# Patient Record
Sex: FEMALE | Race: BLACK OR AFRICAN AMERICAN | NOT HISPANIC OR LATINO | Employment: OTHER | URBAN - METROPOLITAN AREA
[De-identification: names, ages, dates, MRNs, and addresses within clinical notes are randomized per-mention and may not be internally consistent; named-entity substitution may affect disease eponyms.]

---

## 2017-02-28 ENCOUNTER — GENERIC CONVERSION - ENCOUNTER (OUTPATIENT)
Dept: OTHER | Facility: OTHER | Age: 72
End: 2017-02-28

## 2017-06-19 ENCOUNTER — GENERIC CONVERSION - ENCOUNTER (OUTPATIENT)
Dept: OTHER | Facility: OTHER | Age: 72
End: 2017-06-19

## 2017-06-30 ENCOUNTER — GENERIC CONVERSION - ENCOUNTER (OUTPATIENT)
Dept: OTHER | Facility: OTHER | Age: 72
End: 2017-06-30

## 2018-01-10 ENCOUNTER — ALLSCRIPTS OFFICE VISIT (OUTPATIENT)
Dept: OTHER | Facility: OTHER | Age: 73
End: 2018-01-10

## 2018-01-10 DIAGNOSIS — E55.9 VITAMIN D DEFICIENCY: ICD-10-CM

## 2018-01-10 DIAGNOSIS — E78.5 HYPERLIPIDEMIA: ICD-10-CM

## 2018-01-16 ENCOUNTER — GENERIC CONVERSION - ENCOUNTER (OUTPATIENT)
Dept: OTHER | Facility: OTHER | Age: 73
End: 2018-01-16

## 2018-01-16 LAB — SPECIMEN STATUS REPORT (HISTORICAL): NORMAL

## 2018-01-22 VITALS
OXYGEN SATURATION: 95 % | RESPIRATION RATE: 16 BRPM | BODY MASS INDEX: 35.09 KG/M2 | HEART RATE: 60 BPM | DIASTOLIC BLOOD PRESSURE: 80 MMHG | WEIGHT: 198.03 LBS | HEIGHT: 63 IN | SYSTOLIC BLOOD PRESSURE: 138 MMHG

## 2018-02-26 NOTE — PROGRESS NOTES
Assessment    1  Encounter for preventive health examination (V70 0) (Z00 00)   2  Screening for genitourinary condition (V81 6) (Z13 89)   3  Vitamin D deficiency (268 9) (E55 9)   4  Non smoker / tobacco user (V49 89) (Z78 9)   5  Upper back pain on right side (724 5) (M54 9)   6  Osteoarthritis of right knee (715 96) (M17 11)    Plan  Dyslipidemia, Vitamin D deficiency    · (1) VITAMIN D 25-HYDROXY; Status:Active; Requested [id number];    · (Q) LIPID PANEL WITH REFLEX TO DIRECT LDL; Status:Active; Requested  HHW:[id number]; Health Maintenance    · Stop: Influenza  Screening for genitourinary condition    · *VB - Urinary Incontinence Screen (Dx Z13 89 Screen for UI); Status:Complete -  Retrospective By Protocol Authorization;   Done: [id number] 03:52PM  Upper back pain on right side    · TiZANidine HCl - 4 MG Oral Tablet; TAKE 1 TABLET 3 TIMES DAILY AS NEEDED    Discussion/Summary    Consider ortho eval/ injections to the knees  Impression: Initial Annual Wellness Visit, with preventive exam as well as age and risk appropriate counseling completed  Cardiovascular screening and counseling: due for a lipid panel and Dx - V81 2 Screen for CV Disorder  Diabetes screening and counseling: will obtain labs from [facility]  Colorectal cancer screening and counseling: the risks and benefits of screening were discussed  Breast cancer screening and counseling: screening is current and Mammo in March  Cervical cancer screening and counseling: screening is current and She will be seeing gyn soon  Osteoporosis screening and counseling: the risks and benefits of screening were discussed and Will obtain labs  Abdominal aortic aneurysm screening and counseling: screening not indicated  Glaucoma screening and counseling: the risks and benefits of screening were discussed  HIV screening and counseling: screening not indicated   Immunizations: the risks and benefits of influenza vaccination were discussed with the patient, the patient declines the influenza vaccination, the risks and benefits of pneumococcal vaccination were discussed with the patient, Due for Prevnar, would like to defer, the risks and benefits of the Zostavax vaccine were discussed with the patient and get ok from oncologist    Advance Directive Planning: not complete  Patient Discussion: plan discussed with the patient, follow-up visit needed in one year  Possible side effects of new medications were reviewed with the patient/guardian today  The treatment plan was reviewed with the patient/guardian  The patient/guardian understands and agrees with the treatment plan      History of Present Illness  Welcome to Medicare and Wellness Visits: The patient is being seen for the subsequent annual wellness visit  Medicare Screening and Risk Factors   Hospitalizations: no previous hospitalizations  Once per lifetime medicare screening tests: ECG has not been done  Medicare Screening Tests Risk Questions   Abdominal aortic aneurysm risk assessment: none indicated  Osteoporosis risk assessment: female gender and over 48years of age  HIV risk assessment: none indicated  Drug and Alcohol Use: The patient has never smoked cigarettes  The patient reports never drinking alcohol  She has never used illicit drugs  Diet and Physical Activity: Current diet includes well balanced meals  She exercises daily  Exercise: walking 8 hours per week  Mood Disorder and Cognitive Impairment Screening: PHQ-9 Depression Scale   Over the past 2 weeks, how often have you been bothered by the following problems? 1 ) Little interest or pleasure in doing things? Not at all    2 ) Feeling down, depressed or hopeless? Several days  3 ) Trouble falling asleep or sleeping too much? Nearly every day  4 ) Feeling tired or having little energy? Several days  5 ) Poor appetite or overeating?  Not at all    6 ) Feeling bad about yourself, or that you are a failure, or have let yourself or your family down? Not at all    7 ) Trouble concentrating on things, such as reading a newspaper or watching television? Not at all    8 ) Moving or speaking so slowly that other people could have noticed, or the opposite, moving or speaking faster than usual? Not at all  How difficult have these problems made it for you to do your work, take care of things at home, or get along with people? Not at all  Depression screening  negative for symptoms  She denies feeling down, depressed, or hopeless over the past two weeks  She denies feeling little interest or pleasure in doing things over the past two weeks  Cognitive impairment screening: denies difficulty learning/retaining new information, denies difficulty handling complex tasks, denies difficulty with reasoning, denies difficulty with language and denies difficulty with behavior  Functional Ability/Level of Safety: Hearing is normal bilaterally  She denies hearing difficulties  She does not use a hearing aid  The patient is currently able to do activities of daily living without limitations, able to do instrumental activities of daily living without limitations, able to participate in social activities without limitations and able to drive without limitations  Activities of daily living details: does not need help using the phone, no transportation help needed, does not need help shopping, no meal preparation help needed, does not need help doing housework, does not need help doing laundry, does not need help managing medications and does not need help managing money  Fall risk factors: The patient fell 0 times in the past 12 months  Injury History: no polypharmacy, no alcohol use, no mobility impairment, no antidepressant use, no deconditioning, no postural hypotension, no sedative use, no visual impairment, no urinary incontinence, no antihypertensive use, no cognitive impairment, up and go test was normal and no previous fall     Home safety risk factors:  no unfamiliar surroundings, no loose rugs, no poor household lighting, no uneven floors, no household clutter, grab bars in the bathroom and handrails on the stairs  Advance Directives: Advance directives: Currently working on it, but no living will, no durable power of  for health care directives and no advance directives  Co-Managers and Medical Equipment/Suppliers: See Patient Care Team   Preventive Quality Program 65 and Older: The patient currently has no urinary incontinence symptoms  Patient Care Team    Care Team Member Role Specialty Office Number   Carlee Meade DO  Hematology Oncology [phone]   [address] [place] [address]  Surgical Oncology [phone] [address] [facility]  Orthopedic Surgery [phone]     Review of Systems    Constitutional: no fever and no chills  Cardiovascular: no chest pain and no palpitations  Respiratory: no cough  Gastrointestinal: hemorrhoids, uses OTC treatment, but no abdominal pain and no constipation  Genitourinary: no dysuria  Musculoskeletal: pain in the neck on the right upper back, lower back, knees; asking for muscle relaxant, no releif from Aleve  Psychiatric: no anxiety and no depression  Active Problems    1  Anxiety (300 00) (F41 9)   2  Benign colon polyp (211 3) (K63 5)   3  Bulging lumbar disc (722 10) (M51 26)   4  Dyslipidemia (272 4) (E78 5)   5  Flu vaccine need (V04 81) (Z23)   6  Hemorrhoids (455 6) (K64 9)   7  Insomnia (780 52) (G47 00)   8  Osteoarthritis of right knee (715 96) (M17 11)   9  Spider veins (448 1) (I78 1)   10  Uterine cancer (179) (C55)   11   Venous insufficiency (459 81) (I87 2)    Past Medical History    · History of Acute bronchitis (466 0) (J20 9)   · History of Backache (724 5) (M54 9)   · History of Breast Cancer (V10 3)   · History of Cough (786 2) (R05)   · History of headache (V13 89) (X69 068)   · History of Left knee pain (719 46) (M25 562)   · History of Preop examination (V72 84) (Z01 818)   · History of Visit for pre-operative examination (V72 84) (S11 657)    The active problems and past medical history were reviewed and updated today  Surgical History    · History of Breast Surgery Mastectomy   · History of Breast Surgery Reconstruction   · History of Breast Surgery Reduction Procedure   · History of Knee Replacement   · History of Total Abdominal Hysterectomy With Removal Of Ovary(S)   · History of Total Knee Arthroplasty    The surgical history was reviewed and updated today  Family History  Father    · Family history of Malignant Pancreatic Neoplasm    The family history was reviewed and updated today  Social History    · Denied: Alcohol Use (History)   · Exercising Regularly   · Non smoker / tobacco user (V49 89) (Z78 9)  The social history was reviewed and updated today  Current Meds   1  Caltrate 600+D 600-400 MG-UNIT TABS; TAKE 1 TABLET 3 times daily; Therapy: (Recorded:[id number]) to Recorded    The medication list was reviewed and updated today  Allergies    1  Penicillin V Potassium SOLR   2  Percocet TABS    Immunizations   1    Influenza  01-Nov-2014  (69y)    PPSV  01-Nov-2014  (69y)     Vitals  Signs    Heart Rate: 60  Respiration: 16  Systolic: 380  Diastolic: 80  Height: 5 ft 2 5 in  Weight: 198 lb 0 4 oz  BMI Calculated: 35 64  BSA Calculated: 1 91  O2 Saturation: 95    Physical Exam    Constitutional   General appearance: No acute distress, well appearing and well nourished  obese  Head and Face   Head and face: Normal     Eyes   Conjunctiva and lids: No swelling, erythema or discharge  Ears, Nose, Mouth, and Throat   Otoscopic examination: Tympanic membranes translucent with normal light reflex  Canals patent without erythema  Oropharynx: Normal with no erythema, edema, exudate or lesions  Neck   Neck: Supple, symmetric, trachea midline, no masses  Thyroid: Normal, no thyromegaly      Pulmonary   Respiratory effort: No increased work of breathing or signs of respiratory distress  Auscultation of lungs: Clear to auscultation  Cardiovascular   Auscultation of heart: Normal rate and rhythm, normal S1 and S2, no murmurs  Examination of extremities for edema and/or varicosities: Normal     Abdomen   Abdomen: Non-tender, no masses  Lymphatic   Palpation of lymph nodes in neck: No lymphadenopathy      Musculoskeletal   Gait and station: Normal     Psychiatric   Orientation to person, place, and time: Normal     Mood and affect: Normal        Results/Data  *VB - Urinary Incontinence Screen (Dx Z13 89 Screen for UI) [id number] 03:52PM Conner Myers     Test Name Result Flag Reference   Urinary Incontinence Assessment [id number]         Signatures   Electronically signed by : BARRY Vasquez ; Jefry 10 2018 10:52PM EST                       (Author)

## 2018-08-07 DIAGNOSIS — M62.830 BACK SPASM: Primary | ICD-10-CM

## 2018-08-07 RX ORDER — CYCLOBENZAPRINE HCL 5 MG
5 TABLET ORAL
Qty: 10 TABLET | Refills: 0 | Status: SHIPPED | OUTPATIENT
Start: 2018-08-07 | End: 2018-12-28 | Stop reason: HOSPADM

## 2018-08-07 NOTE — TELEPHONE ENCOUNTER
Pls call her to schedule a f/u if the back pain does not improve  Also, she is due for a routine f/u

## 2018-08-08 NOTE — TELEPHONE ENCOUNTER
Patient called back she said she is taking the muscle relaxant and ibuprofen, she said she is going to see if it helps this week  But if she does not improve she will call next week to set up a follow up appointment

## 2018-12-27 ENCOUNTER — OFFICE VISIT (OUTPATIENT)
Dept: INTERNAL MEDICINE CLINIC | Facility: CLINIC | Age: 73
End: 2018-12-27
Payer: MEDICARE

## 2018-12-27 VITALS
DIASTOLIC BLOOD PRESSURE: 74 MMHG | BODY MASS INDEX: 34.76 KG/M2 | HEIGHT: 63 IN | WEIGHT: 196.2 LBS | SYSTOLIC BLOOD PRESSURE: 124 MMHG

## 2018-12-27 DIAGNOSIS — C50.912 BREAST CANCER, STAGE 1, LEFT (HCC): ICD-10-CM

## 2018-12-27 DIAGNOSIS — C55 MALIGNANT NEOPLASM OF UTERUS, UNSPECIFIED SITE (HCC): ICD-10-CM

## 2018-12-27 DIAGNOSIS — R93.2 ABNORMAL CT OF LIVER: ICD-10-CM

## 2018-12-27 DIAGNOSIS — G89.29 CHRONIC BILATERAL LOW BACK PAIN WITHOUT SCIATICA: Primary | ICD-10-CM

## 2018-12-27 DIAGNOSIS — M54.50 CHRONIC BILATERAL LOW BACK PAIN WITHOUT SCIATICA: Primary | ICD-10-CM

## 2018-12-27 DIAGNOSIS — Z85.3 HISTORY OF BREAST CANCER: ICD-10-CM

## 2018-12-27 PROCEDURE — 1036F TOBACCO NON-USER: CPT | Performed by: INTERNAL MEDICINE

## 2018-12-27 PROCEDURE — 1160F RVW MEDS BY RX/DR IN RCRD: CPT | Performed by: INTERNAL MEDICINE

## 2018-12-27 PROCEDURE — 99214 OFFICE O/P EST MOD 30 MIN: CPT | Performed by: INTERNAL MEDICINE

## 2018-12-27 RX ORDER — PREDNISONE 50 MG/1
TABLET ORAL
Refills: 0 | COMMUNITY
Start: 2018-12-22 | End: 2019-02-08 | Stop reason: ALTCHOICE

## 2018-12-27 RX ORDER — GABAPENTIN 100 MG/1
CAPSULE ORAL
Qty: 90 CAPSULE | Refills: 2 | Status: SHIPPED | OUTPATIENT
Start: 2018-12-27 | End: 2019-02-08 | Stop reason: HOSPADM

## 2018-12-27 NOTE — PROGRESS NOTES
Assessment/Plan:  I suspect that the pain in the LE is related to lumbar pathology  Start gabapentin  Schedule MRI and establish with pain mgt  Incidental liver lesion on CT-obtain MRI       Problem List Items Addressed This Visit        Genitourinary    Uterine cancer ([facility] [address] )       Other    Breast cancer, stage 1, left ([facility] [address] )    History of breast cancer      Other Visit Diagnoses     Chronic bilateral low back pain without sciatica    -  Primary    Relevant Medications    gabapentin (NEURONTIN) 100 mg capsule    Other Relevant Orders    Ambulatory referral to Pain Management    MRI lumbar spine w wo contrast    Abnormal CT of liver        Relevant Orders    MRI abdomen w wo contrast            Subjective:      Patient ID: Anu Butterfield is a 75 y o  female  HPI  Chronic burning, numbness, cold feeling  In both legs, below the knees, not the feet  She has had both knees replaced without improvement of the pain  + Chronic low back pain but does not radiate  She went to urgent care on 12/22 then was sent to the ER when her DDimer was elevated  Lumbar spine XR  Degenerative changes  No DVT in LE dopplers; No PE on CTA of the chest but +nonspecific right lung nodular opacities, liver lesion to be further evaluated with an MRI and cholelithiasis  Worried abut findings because she has had right and left breast cancer and endometrial cancer    The following portions of the patient's history were reviewed and updated as appropriate: allergies, current medications, past family history, past medical history, past social history and problem list     Review of Systems   Constitutional: Negative for fatigue, fever and unexpected weight change  HENT: Negative for ear pain, hearing loss, sinus pain, sinus pressure and sore throat  Respiratory: Negative for cough, shortness of breath and wheezing  Cardiovascular: Negative for chest pain, palpitations and leg swelling     Gastrointestinal: Negative for abdominal pain, constipation, diarrhea, nausea and vomiting  Musculoskeletal: Positive for back pain  Negative for arthralgias and myalgias  Neurological: Positive for numbness  Negative for dizziness and headaches  Objective:      /74   Ht 5' 3" (1 6 m)   Wt 89 kg (196 lb 3 2 oz)   BMI 34 76 kg/m²          Physical Exam   Constitutional: She is oriented to person, place, and time  She appears well-developed and well-nourished  HENT:   Head: Normocephalic and atraumatic  Eyes: Conjunctivae are normal    Neck: Neck supple  Cardiovascular: Normal rate, regular rhythm and normal heart sounds  No murmur heard  Pulmonary/Chest: Effort normal and breath sounds normal  No respiratory distress  She has no wheezes  She has no rales  Abdominal: Soft  Bowel sounds are normal  She exhibits no distension and no mass  There is no tenderness  There is no rebound and no guarding  Musculoskeletal: Normal range of motion  She exhibits no edema  No tenderness in the lumbar spine  -SLR   Neurological: She is alert and oriented to person, place, and time  Skin: Skin is warm and dry  Psychiatric: She has a normal mood and affect   Her behavior is normal  Judgment and thought content normal

## 2018-12-28 PROBLEM — C50.912 BREAST CANCER, STAGE 1, LEFT (HCC): Status: ACTIVE | Noted: 2017-02-27

## 2018-12-28 PROBLEM — Z85.3 HISTORY OF BREAST CANCER: Status: ACTIVE | Noted: 2018-06-20

## 2019-01-14 ENCOUNTER — TELEPHONE (OUTPATIENT)
Dept: INTERNAL MEDICINE CLINIC | Facility: CLINIC | Age: 74
End: 2019-01-14

## 2019-01-14 DIAGNOSIS — F40.240 CLAUSTROPHOBIA: Primary | ICD-10-CM

## 2019-01-14 RX ORDER — ALPRAZOLAM 0.25 MG/1
TABLET ORAL
Qty: 3 TABLET | Refills: 0 | Status: SHIPPED | OUTPATIENT
Start: 2019-01-14 | End: 2019-02-08 | Stop reason: SDUPTHER

## 2019-01-14 NOTE — TELEPHONE ENCOUNTER
Patient is scheduled for an MRI tomorrow and on Thursday  Patient said she is claustrophobic and is asking if you can give her a sedative to help relax her      Please advise

## 2019-01-15 ENCOUNTER — HOSPITAL ENCOUNTER (OUTPATIENT)
Dept: MRI IMAGING | Facility: HOSPITAL | Age: 74
Discharge: HOME | End: 2019-01-15
Payer: COMMERCIAL

## 2019-01-15 DIAGNOSIS — M54.50 CHRONIC BILATERAL LOW BACK PAIN WITHOUT SCIATICA: ICD-10-CM

## 2019-01-15 DIAGNOSIS — G89.29 CHRONIC BILATERAL LOW BACK PAIN WITHOUT SCIATICA: ICD-10-CM

## 2019-01-15 PROCEDURE — A9585 GADOBUTROL INJECTION: HCPCS | Performed by: INTERNAL MEDICINE

## 2019-01-15 PROCEDURE — 72158 MRI LUMBAR SPINE W/O & W/DYE: CPT

## 2019-01-15 RX ADMIN — GADOBUTROL 8 ML: 604.72 INJECTION INTRAVENOUS at 07:19

## 2019-01-17 ENCOUNTER — HOSPITAL ENCOUNTER (OUTPATIENT)
Dept: MRI IMAGING | Facility: HOSPITAL | Age: 74
Discharge: HOME | End: 2019-01-17
Payer: COMMERCIAL

## 2019-01-17 DIAGNOSIS — R93.2 ABNORMAL CT OF LIVER: ICD-10-CM

## 2019-01-17 DIAGNOSIS — K80.20 CALCULUS OF GALLBLADDER WITHOUT CHOLECYSTITIS WITHOUT OBSTRUCTION: Primary | ICD-10-CM

## 2019-01-17 PROCEDURE — A9585 GADOBUTROL INJECTION: HCPCS | Performed by: INTERNAL MEDICINE

## 2019-01-17 PROCEDURE — 74183 MRI ABD W/O CNTR FLWD CNTR: CPT

## 2019-01-17 RX ADMIN — GADOBUTROL 9 ML: 604.72 INJECTION INTRAVENOUS at 08:55

## 2019-01-28 ENCOUNTER — TRANSCRIBE ORDERS (OUTPATIENT)
Dept: ADMINISTRATIVE | Facility: HOSPITAL | Age: 74
End: 2019-01-28

## 2019-01-28 DIAGNOSIS — K80.18 CALCULUS OF GALLBLADDER WITH OTHER CHOLECYSTITIS WITHOUT OBSTRUCTION: Primary | ICD-10-CM

## 2019-01-31 ENCOUNTER — OFFICE VISIT (OUTPATIENT)
Dept: PAIN MEDICINE | Facility: MEDICAL CENTER | Age: 74
End: 2019-01-31
Payer: COMMERCIAL

## 2019-01-31 VITALS
RESPIRATION RATE: 14 BRPM | HEIGHT: 63 IN | HEART RATE: 71 BPM | SYSTOLIC BLOOD PRESSURE: 128 MMHG | BODY MASS INDEX: 34.45 KG/M2 | WEIGHT: 194.4 LBS | DIASTOLIC BLOOD PRESSURE: 83 MMHG

## 2019-01-31 DIAGNOSIS — M54.50 CHRONIC BILATERAL LOW BACK PAIN WITHOUT SCIATICA: ICD-10-CM

## 2019-01-31 DIAGNOSIS — G89.29 CHRONIC BILATERAL LOW BACK PAIN WITHOUT SCIATICA: ICD-10-CM

## 2019-01-31 DIAGNOSIS — M48.061 SPINAL STENOSIS OF LUMBAR REGION WITHOUT NEUROGENIC CLAUDICATION: ICD-10-CM

## 2019-01-31 DIAGNOSIS — M47.816 LUMBAR SPONDYLOSIS: Primary | ICD-10-CM

## 2019-01-31 PROCEDURE — 99204 OFFICE O/P NEW MOD 45 MIN: CPT | Performed by: PHYSICAL MEDICINE & REHABILITATION

## 2019-01-31 RX ORDER — KETOROLAC TROMETHAMINE 10 MG/1
10 TABLET, FILM COATED ORAL EVERY 6 HOURS PRN
COMMUNITY
End: 2019-02-08 | Stop reason: ALTCHOICE

## 2019-01-31 NOTE — LETTER
January 31, 2019     Ping Saravia MD  [address] [address]  [address] [place] [address]    Patient: Micaela Stark   YOB: 1945   Date of Visit: 1/31/2019       Dear Dr Vicky Cesar: Thank you for referring Micaela Stark to me for evaluation  Below are my notes for this consultation  If you have questions, please do not hesitate to call me  I look forward to following your patient along with you  Sincerely,        Mendoza Chamberlain DO        CC: No Recipients  Mendoza Chamberlain DO  1/31/2019 10:10 AM  Sign at close encounter  Assessment:  1  Lumbar spondylosis    2  Chronic bilateral low back pain without sciatica    3  Spinal stenosis of lumbar region without neurogenic claudication        Plan:  1  We will schedule the patient for bilateral L3-5 medial branch nerve blocks with intention of moving forward towards radiofrequency ablation if there is an appropriate diagnostic response  The initial blocks will be performed with 2% lidocaine and if an appropriate response is obtained upon review of the patient's pain diary, a confirmatory block will be scheduled with 0 5% bupivacaine  In the office today, we reviewed the nature of facet joint pathology in depth using a spine model  We discussed the approach we would use for the injections and provided literature for home review  The patient understands the risks associated with the procedure including bleeding, infection, tissue injury, and allergic reaction and provided verbal informed consent in the office today  2  The patient does have some rather significant spinal stenosis  On exam she does not demonstrate any significant signs or symptomatology consistent with neurogenic claudication  3  She is complaining of bilateral knee pain and I have asked her to follow back up with her orthopedic surgeon for further evaluation    If this continues to be problematic and there are no treatments offered from her orthopedist would consider genicular nerve blocks  My impressions and treatment recommendations were discussed in detail with the patient who verbalized understanding and had no further questions  Discharge instructions were provided  I personally saw and examined the patient and I agree with the above discussed plan of care  Orders Placed This Encounter   Procedures    FL spine and pain procedure     Standing Status:   Future     Standing Expiration Date:   1/31/2020     Order Specific Question:   Reason for Exam:     Answer:   (B) L3-5 MBB#1     Order Specific Question:   Anticoagulant hold needed? Answer:   no     New Medications Ordered This Visit   Medications    iohexol (OMNIPAQUE) 300 mg/mL     Sig: Infuse 100 mL into a venous catheter once in imaging for contrast    ketorolac (TORADOL) 10 mg tablet     Sig: Take 10 mg by mouth every 6 (six) hours as needed for moderate pain       History of Present Illness: Wayne Esparza is a 75 y o  female sent for consultation regarding chronic axial lower back pain as well as bilateral knee pain  The patient describes continuous ongoing pain for the past 8 years without any inciting event or trauma and describes this as severe pain rated as a 9/10 which is constant without any typical pattern  She characterizes the pain as cramping, numbness, sharp, dull, aching, pressure-like, throbbing  She localizes most the pains are her axial lumbar spine without radiation  She also describes bilateral knee pain with some referral into the shins  She denies any radiating pain from the back into the feet  Aggravating factors include lying down, standing, bending, sitting, walking  She has had bilateral total knee arthroplasties  She describes no relief from this    She also has not experience any relief with physical therapy, exercise, heat or ice application, chiropractic manipulation or osteopathic manipulation care    Currently using cyclobenzaprine, Prednisone, and gabapentin without relief of symptoms  I have personally reviewed and/or updated the patient's past medical history, past surgical history, family history, social history, current medications, allergies, and vital signs today  Review of Systems:    Review of Systems   Cardiovascular: Positive for leg swelling  Musculoskeletal: Positive for back pain (and lower part of both legs), gait problem and joint swelling  Neurological: Positive for dizziness and weakness  Patient Active Problem List   Diagnosis    Breast cancer[facility] stage 1, left ([facility] )    Uterine cancer ([facility] )    History of breast cancer       Past Medical History:   Diagnosis Date    Breast cancer ([facility] )     B/L       Past Surgical History:   Procedure Laterality Date    BREAST LUMPECTOMY Right     BREAST RECONSTRUCTION Right 2010    JOINT REPLACEMENT Bilateral     MASTECTOMY Right     ORTHOPEDIC SURGERY Bilateral     knee arthoscopy    RADICAL ABDOMINAL HYSTERECTOMY         Family History   Problem Relation Age of Onset   Devonte Gates Stroke Mother     Hypertension Mother     Cancer Father        Social History     Occupational History    retired      Social History Main Topics    Smoking status: Never Smoker    Smokeless tobacco: Never Used    Alcohol use No    Drug use: No    Sexual activity: No       Current Outpatient Prescriptions on File Prior to Visit   Medication Sig    ALPRAZolam (XANAX) 0 25 mg tablet 1-2 tabs PO 30mins prior to the MRI    Calcium Carbonate-Vitamin D (CALTRATE 600+D) 600-400 MG-UNIT per tablet Take 1 tablet by mouth 3 (three) times a day    gabapentin (NEURONTIN) 100 mg capsule 1 cap PO daily for 3 days and increase to BID then to TID if tolerated    predniSONE 50 mg tablet TAKE 1 TABLET BY MOUTH DAILY FOR 6 DAYS     No current facility-administered medications on file prior to visit          Allergies   Allergen Reactions    Other Other (See Comments)     rash    Oxycodone-Acetaminophen GI Intolerance     Other reaction(s): GI  issues    Penicillin V Rash     Reaction Date: [id number];        Physical Exam:    /83   Pulse 71   Resp 14   Ht 5' 3" (1 6 m)   Wt 88 2 kg (194 lb 6 4 oz)   BMI 34 44 kg/m²      LUMBAR  General: Well-developed, well-nourished individual in no acute distress  Mental: Appropriate mood and affect  Grossly oriented with coherent speech and thought processing   Neuro:  Cranial nerves: Cranial nerve function is grossly intact bilaterally   Strength: Bilateral lower extremity strength is normal and symmetric   No atrophy or tone abnormalities noted   Reflexes: Bilateral lower extremity muscle stretch reflexes are absent at the knees and ankles    No ankle clonus is noted   Sensation: No loss of sensation is noted   SLR/Foraminal Compression Maneuvers: Straight leg raising in the sitting position is  positive for radicular pain   Gait:  Gait/gross motor: Gait is normal  Station is normal  Toe walking, heel walking  are normal     Musculoskeletal:  Palpation: Inspection and palpation of the spine and extremities are unremarkable except for tenderness to palpation over the lumbar facet joints  Spine: Normal pain-free range of motion except for lumbar extension which is significantly limited in reproduces her axial mechanical low back pain  No gross axial skeletal deformities   Skin: Skin inspection grossly negative for erythema, breakdown, or concerning lesions in affected area   Lymph: No lymphadenopathy is appreciated in the involved extremity   Vessels: No lower extremity edema   Lungs: Breathing is comfortable and regular  No dyspnea noted during examination   Eyes: Visual field grossly intact to confrontation  No redness appreciated  ENT: No craniofacial deformities or asymmetry  No neck masses appreciated           ImagingReason For Exam     chronic low back pain with neuropathy in both LE   Dx: Chronic bilateral low back pain without sciatica [M54 5, G20 29 (ICD-10-CM)]   Study Result     MRI LUMBAR SPINE WITH AND WITHOUT CONTRAST     INDICATION: M54 5: Low back pain  G89 29: Other chronic pain  Chronic bilateral low back pain, neuropathy without sciatica     COMPARISON:  None      TECHNIQUE:  Sagittal T1, sagittal T2, sagittal inversion recovery, axial T1 and axial T2, coronal T2  Sagittal and axial T1 postcontrast      IV Contrast:  8 mL of gadobutrol injection (MULTI-DOSE)      IMAGE QUALITY:  Diagnostic     FINDINGS:     VERTEBRAL BODIES:  Degenerative grade 1 L4-L5 anterolisthesis, there is also minor anterolisthesis of L4 referable to the slightly retrolisthesed L3 vertebral body  Minor leftward translation of L3  Normal marrow signal is identified within the   visualized bony structures  No discrete marrow lesion      SACRUM:  Normal signal within the sacrum  No evidence of insufficiency or stress fracture      DISTAL CORD AND CONUS:  Normal size and signal within the distal cord and conus        PARASPINAL SOFT TISSUES:  Paraspinal soft tissues are unremarkable      LOWER THORACIC DISC SPACES:  Normal disc height and signal   No disc herniation, canal stenosis or foraminal narrowing      LUMBAR DISC SPACES:     L1-L2:  Small marginal osteophytes, minor bulge      L2-L3:  Slight reduction disc height, minor circumferential bulge with moderate facet arthrosis      L3-L4:  Moderate facet arthrosis, relative retrolisthesis of L3 on L4  Minor narrowing of both lateral recesses      L4-L5:  Degenerative grade 1 anterolisthesis  Disc extends asymmetrically to the left, into the foramen, no evidence for L4 root compromise  Redundancy of the ligaments and circumferential bulge result in the moderate right and moderately severe left   lateral recess stenosis, correlate for left L5 radiculitis        L5-S1:  Moderate bilateral facet arthrosis, minor bulge      POSTCONTRAST IMAGING:  No abnormal enhancement      IMPRESSION:     Degenerative grade 1 anterolisthesis L4 on L5 with potentially significant left lateral recess stenosis    Correlate for left L5 radiculitis         Workstation performed: [id number]

## 2019-01-31 NOTE — PROGRESS NOTES
Assessment:  1  Lumbar spondylosis    2  Chronic bilateral low back pain without sciatica    3  Spinal stenosis of lumbar region without neurogenic claudication        Plan:  1  We will schedule the patient for bilateral L3-5 medial branch nerve blocks with intention of moving forward towards radiofrequency ablation if there is an appropriate diagnostic response  The initial blocks will be performed with 2% lidocaine and if an appropriate response is obtained upon review of the patient's pain diary, a confirmatory block will be scheduled with 0 5% bupivacaine  In the office today, we reviewed the nature of facet joint pathology in depth using a spine model  We discussed the approach we would use for the injections and provided literature for home review  The patient understands the risks associated with the procedure including bleeding, infection, tissue injury, and allergic reaction and provided verbal informed consent in the office today  2  The patient does have some rather significant spinal stenosis  On exam she does not demonstrate any significant signs or symptomatology consistent with neurogenic claudication  3  She is complaining of bilateral knee pain and I have asked her to follow back up with her orthopedic surgeon for further evaluation  If this continues to be problematic and there are no treatments offered from her orthopedist would consider genicular nerve blocks  My impressions and treatment recommendations were discussed in detail with the patient who verbalized understanding and had no further questions  Discharge instructions were provided  I personally saw and examined the patient and I agree with the above discussed plan of care      Orders Placed This Encounter   Procedures    FL spine and pain procedure     Standing Status:   Future     Standing Expiration Date:   1/31/2020     Order Specific Question:   Reason for Exam:     Answer:   (B) L3-5 MBB#1     Order Specific Question:   Anticoagulant hold needed? Answer:   no     New Medications Ordered This Visit   Medications    iohexol (OMNIPAQUE) 300 mg/mL     Sig: Infuse 100 mL into a venous catheter once in imaging for contrast    ketorolac (TORADOL) 10 mg tablet     Sig: Take 10 mg by mouth every 6 (six) hours as needed for moderate pain       History of Present Illness: Mary Salazar is a 75 y o  female sent for consultation regarding chronic axial lower back pain as well as bilateral knee pain  The patient describes continuous ongoing pain for the past 8 years without any inciting event or trauma and describes this as severe pain rated as a 9/10 which is constant without any typical pattern  She characterizes the pain as cramping, numbness, sharp, dull, aching, pressure-like, throbbing  She localizes most the pains are her axial lumbar spine without radiation  She also describes bilateral knee pain with some referral into the shins  She denies any radiating pain from the back into the feet  Aggravating factors include lying down, standing, bending, sitting, walking  She has had bilateral total knee arthroplasties  She describes no relief from this  She also has not experience any relief with physical therapy, exercise, heat or ice application, chiropractic manipulation or osteopathic manipulation care    Currently using cyclobenzaprine, Prednisone, and gabapentin without relief of symptoms  I have personally reviewed and/or updated the patient's past medical history, past surgical history, family history, social history, current medications, allergies, and vital signs today  Review of Systems:    Review of Systems   Cardiovascular: Positive for leg swelling  Musculoskeletal: Positive for back pain (and lower part of both legs), gait problem and joint swelling  Neurological: Positive for dizziness and weakness         Patient Active Problem List   Diagnosis    [facility] left [facility]    [facility]terine cancer [facility]    History of breast cancer       Past Medical History:   Diagnosis Date    Breast cancer [facility] )     B/L       Past Surgical History:   Procedure Laterality Date    BREAST LUMPECTOMY Right     BREAST RECONSTRUCTION Right 2010    JOINT REPLACEMENT Bilateral     MASTECTOMY Right     ORTHOPEDIC SURGERY Bilateral     knee arthoscopy    RADICAL ABDOMINAL HYSTERECTOMY         Family History   Problem Relation Age of Onset   Amalia Blue Stroke Mother     Hypertension Mother     Cancer Father        Social History     Occupational History    retired      Social History Main Topics    Smoking status: Never Smoker    Smokeless tobacco: Never Used    Alcohol use No    Drug use: No    Sexual activity: No       Current Outpatient Prescriptions on File Prior to Visit   Medication Sig    ALPRAZolam (XANAX) 0 25 mg tablet 1-2 tabs PO 30mins prior to the MRI    Calcium Carbonate-Vitamin D (CALTRATE 600+D) 600-400 MG-UNIT per tablet Take 1 tablet by mouth 3 (three) times a day    gabapentin (NEURONTIN) 100 mg capsule 1 cap PO daily for 3 days and increase to BID then to TID if tolerated    predniSONE 50 mg tablet TAKE 1 TABLET BY MOUTH DAILY FOR 6 DAYS     No current facility-administered medications on file prior to visit  Allergies   Allergen Reactions    Other Other (See Comments)     rash    Oxycodone-Acetaminophen GI Intolerance     Other reaction(s): GI  issues    Penicillin V Rash     Reaction Date: [id number];        Physical Exam:    /83   Pulse 71   Resp 14   Ht 5' 3" (1 6 m)   Wt 88 2 kg (194 lb 6 4 oz)   BMI 34 44 kg/m²     LUMBAR  General: Well-developed, well-nourished individual in no acute distress  Mental: Appropriate mood and affect  Grossly oriented with coherent speech and thought processing   Neuro:  Cranial nerves: Cranial nerve function is grossly intact bilaterally   Strength: Bilateral lower extremity strength is normal and symmetric    No atrophy or tone abnormalities noted   Reflexes: Bilateral lower extremity muscle stretch reflexes are absent at the knees and ankles    No ankle clonus is noted   Sensation: No loss of sensation is noted   SLR/Foraminal Compression Maneuvers: Straight leg raising in the sitting position is  positive for radicular pain   Gait:  Gait/gross motor: Gait is normal  Station is normal  Toe walking, heel walking  are normal     Musculoskeletal:  Palpation: Inspection and palpation of the spine and extremities are unremarkable except for tenderness to palpation over the lumbar facet joints  Spine: Normal pain-free range of motion except for lumbar extension which is significantly limited in reproduces her axial mechanical low back pain  No gross axial skeletal deformities   Skin: Skin inspection grossly negative for erythema, breakdown, or concerning lesions in affected area   Lymph: No lymphadenopathy is appreciated in the involved extremity   Vessels: No lower extremity edema   Lungs: Breathing is comfortable and regular  No dyspnea noted during examination   Eyes: Visual field grossly intact to confrontation  No redness appreciated  ENT: No craniofacial deformities or asymmetry  No neck masses appreciated  ImagingReason For Exam     chronic low back pain with neuropathy in both LE   Dx: Chronic bilateral low back pain without sciatica [M54 5, G89 29 (ICD-10-CM)]   Study Result     MRI LUMBAR SPINE WITH AND WITHOUT CONTRAST     INDICATION: M54 5: Low back pain  G89 29: Other chronic pain  Chronic bilateral low back pain, neuropathy without sciatica     COMPARISON:  None      TECHNIQUE:  Sagittal T1, sagittal T2, sagittal inversion recovery, axial T1 and axial T2, coronal T2    Sagittal and axial T1 postcontrast      IV Contrast:  8 mL of gadobutrol injection (MULTI-DOSE)      IMAGE QUALITY:  Diagnostic     FINDINGS:     VERTEBRAL BODIES:  Degenerative grade 1 L4-L5 anterolisthesis, there is also minor anterolisthesis of L4 referable to the slightly retrolisthesed L3 vertebral body  Minor leftward translation of L3  Normal marrow signal is identified within the   visualized bony structures  No discrete marrow lesion      SACRUM:  Normal signal within the sacrum  No evidence of insufficiency or stress fracture      DISTAL CORD AND CONUS:  Normal size and signal within the distal cord and conus        PARASPINAL SOFT TISSUES:  Paraspinal soft tissues are unremarkable      LOWER THORACIC DISC SPACES:  Normal disc height and signal   No disc herniation, canal stenosis or foraminal narrowing      LUMBAR DISC SPACES:     L1-L2:  Small marginal osteophytes, minor bulge      L2-L3:  Slight reduction disc height, minor circumferential bulge with moderate facet arthrosis      L3-L4:  Moderate facet arthrosis, relative retrolisthesis of L3 on L4  Minor narrowing of both lateral recesses      L4-L5:  Degenerative grade 1 anterolisthesis  Disc extends asymmetrically to the left, into the foramen, no evidence for L4 root compromise  Redundancy of the ligaments and circumferential bulge result in the moderate right and moderately severe left   lateral recess stenosis, correlate for left L5 radiculitis        L5-S1:  Moderate bilateral facet arthrosis, minor bulge      POSTCONTRAST IMAGING:  No abnormal enhancement      IMPRESSION:     Degenerative grade 1 anterolisthesis L4 on L5 with potentially significant left lateral recess stenosis    Correlate for left L5 radiculitis         Workstation performed: [id number]

## 2019-02-08 ENCOUNTER — OFFICE VISIT (OUTPATIENT)
Dept: INTERNAL MEDICINE CLINIC | Facility: CLINIC | Age: 74
End: 2019-02-08
Payer: COMMERCIAL

## 2019-02-08 VITALS
RESPIRATION RATE: 14 BRPM | HEART RATE: 63 BPM | TEMPERATURE: 97.5 F | BODY MASS INDEX: 34.12 KG/M2 | SYSTOLIC BLOOD PRESSURE: 124 MMHG | OXYGEN SATURATION: 96 % | WEIGHT: 192.6 LBS | DIASTOLIC BLOOD PRESSURE: 72 MMHG | HEIGHT: 63 IN

## 2019-02-08 DIAGNOSIS — M47.816 SPONDYLOSIS OF LUMBAR SPINE: Primary | ICD-10-CM

## 2019-02-08 DIAGNOSIS — F40.240 CLAUSTROPHOBIA: ICD-10-CM

## 2019-02-08 DIAGNOSIS — R91.1 PULMONARY NODULE: ICD-10-CM

## 2019-02-08 DIAGNOSIS — K80.20 GALL STONES: ICD-10-CM

## 2019-02-08 DIAGNOSIS — C50.912 BREAST CANCER, STAGE 1, LEFT (HCC): ICD-10-CM

## 2019-02-08 DIAGNOSIS — C55 MALIGNANT NEOPLASM OF UTERUS, UNSPECIFIED SITE (HCC): ICD-10-CM

## 2019-02-08 PROCEDURE — 1101F PT FALLS ASSESS-DOCD LE1/YR: CPT | Performed by: INTERNAL MEDICINE

## 2019-02-08 PROCEDURE — 99214 OFFICE O/P EST MOD 30 MIN: CPT | Performed by: INTERNAL MEDICINE

## 2019-02-08 RX ORDER — ALPRAZOLAM 0.25 MG/1
TABLET ORAL
Qty: 3 TABLET | Refills: 0 | Status: SHIPPED | OUTPATIENT
Start: 2019-02-08 | End: 2019-07-15 | Stop reason: SDUPTHER

## 2019-02-08 NOTE — PROGRESS NOTES
Assessment/Plan:  Medically stable for planned procedures (lenora and nerve block)  Wean off gabapentin since it has not helped and has caused fatigue and drowsiness       Problem List Items Addressed This Visit        Genitourinary    Uterine cancer ([facility] )     Up to date with gyn exam            Other    Breast cancer, stage 1, left ([facility] )     Up to date with mammo         Pulmonary nodule     Stable  Monitored by pulmonology           Other Visit Diagnoses     Spondylosis of lumbar spine    -  Primary    Gall stones        Claustrophobia        Relevant Medications    ALPRAZolam (XANAX) 0 25 mg tablet            Subjective:      Patient ID: Kaylie Nicholas is a 75 y o  female  HPI  Lap lenora for gall stones 2/15 Dr Lois Porter  Bilateral L3-5 medial branch block on 3/6/19  Appt with Dr Patricio Campoverde in March for her knees  Low back pain down to both legs  kanchan after prolonged sitting walking laying flat  Lumbar MRI showed:  "Degenerative grade 1 anterolisthesis L4 on L5 with potentially significant left lateral recess stenosis  Correlate for left L5 radiculitis "  Gabapentin does not help, makes her tired  Abdominal MRI to follow up a lesion seen on the CT showed fatty liver,  gall stones  EKG on 1/28 normal    The following portions of the patient's history were reviewed and updated as appropriate: allergies, past family history, past medical history, past social history, past surgical history and problem list     Review of Systems   Constitutional: Negative for fatigue, fever and unexpected weight change  HENT: Negative for sinus pain, sinus pressure and sore throat  Respiratory: Negative for cough, shortness of breath and wheezing  Cardiovascular: Negative for chest pain, palpitations and leg swelling  Gastrointestinal: Positive for abdominal pain  Negative for constipation, diarrhea, nausea and vomiting  Musculoskeletal: Positive for back pain  Negative for arthralgias and myalgias  Objective:      /72   Pulse 63   Temp 97 5 °F (36 4 °C) (Tympanic)   Resp 14   Ht 5' 2 56" (1 589 m)   Wt 87 4 kg (192 lb 9 6 oz)   SpO2 96%   BMI 34 60 kg/m²          Physical Exam   Constitutional: She is oriented to person, place, and time  She appears well-developed and well-nourished  HENT:   Head: Normocephalic and atraumatic  Right Ear: External ear normal    Left Ear: External ear normal    Mouth/Throat: Oropharynx is clear and moist    Eyes: Conjunctivae are normal    Neck: Neck supple  Cardiovascular: Normal rate, regular rhythm and normal heart sounds  No murmur heard  Pulmonary/Chest: Effort normal and breath sounds normal  No respiratory distress  She has no wheezes  She has no rales  Abdominal: Soft  Bowel sounds are normal  She exhibits no distension and no mass  There is tenderness (RUQ)  There is no rebound and no guarding  Neurological: She is alert and oriented to person, place, and time  Skin: Skin is warm and dry  Psychiatric: She has a normal mood and affect   Her behavior is normal  Judgment and thought content normal

## 2019-02-12 RX ORDER — GABAPENTIN 100 MG/1
100 CAPSULE ORAL 2 TIMES DAILY
COMMUNITY
End: 2019-07-15 | Stop reason: SDUPTHER

## 2019-02-12 NOTE — PRE-PROCEDURE INSTRUCTIONS
Pre-Surgery Instructions:   Medication Instructions    ALPRAZolam (XANAX) 0 25 mg tablet Instructed patient per Anesthesia Guidelines   Calcium Carbonate-Vitamin D (CALTRATE 600+D) 600-400 MG-UNIT per tablet Instructed patient per Anesthesia Guidelines   gabapentin (NEURONTIN) 100 mg capsule Instructed patient per Anesthesia Guidelines      Pre op and showering instructions reviewed-Patient has hibiclens

## 2019-02-14 ENCOUNTER — ANESTHESIA EVENT (OUTPATIENT)
Dept: PERIOP | Facility: HOSPITAL | Age: 74
End: 2019-02-14
Payer: COMMERCIAL

## 2019-02-15 ENCOUNTER — HOSPITAL ENCOUNTER (OUTPATIENT)
Facility: HOSPITAL | Age: 74
Setting detail: OUTPATIENT SURGERY
Discharge: HOME | End: 2019-02-15
Attending: SURGERY | Admitting: SURGERY
Payer: COMMERCIAL

## 2019-02-15 ENCOUNTER — ANESTHESIA (OUTPATIENT)
Dept: PERIOP | Facility: HOSPITAL | Age: 74
End: 2019-02-15
Payer: COMMERCIAL

## 2019-02-15 VITALS
HEIGHT: 63 IN | HEART RATE: 71 BPM | BODY MASS INDEX: 34.02 KG/M2 | TEMPERATURE: 97 F | DIASTOLIC BLOOD PRESSURE: 72 MMHG | OXYGEN SATURATION: 92 % | WEIGHT: 192 LBS | RESPIRATION RATE: 18 BRPM | SYSTOLIC BLOOD PRESSURE: 135 MMHG

## 2019-02-15 DIAGNOSIS — K80.10 CALCULUS OF GALLBLADDER WITH CHRONIC CHOLECYSTITIS WITHOUT OBSTRUCTION: ICD-10-CM

## 2019-02-15 PROCEDURE — 88304 TISSUE EXAM BY PATHOLOGIST: CPT | Performed by: PATHOLOGY

## 2019-02-15 PROCEDURE — 47562 LAPAROSCOPIC CHOLECYSTECTOMY: CPT | Performed by: PHYSICIAN ASSISTANT

## 2019-02-15 RX ORDER — DEXAMETHASONE SODIUM PHOSPHATE 4 MG/ML
INJECTION, SOLUTION INTRA-ARTICULAR; INTRALESIONAL; INTRAMUSCULAR; INTRAVENOUS; SOFT TISSUE AS NEEDED
Status: DISCONTINUED | OUTPATIENT
Start: 2019-02-15 | End: 2019-02-15 | Stop reason: SURG

## 2019-02-15 RX ORDER — BUPIVACAINE HYDROCHLORIDE AND EPINEPHRINE 5; 5 MG/ML; UG/ML
INJECTION, SOLUTION EPIDURAL; INTRACAUDAL; PERINEURAL AS NEEDED
Status: DISCONTINUED | OUTPATIENT
Start: 2019-02-15 | End: 2019-02-15 | Stop reason: HOSPADM

## 2019-02-15 RX ORDER — EPHEDRINE SULFATE 50 MG/ML
INJECTION, SOLUTION INTRAVENOUS AS NEEDED
Status: DISCONTINUED | OUTPATIENT
Start: 2019-02-15 | End: 2019-02-15 | Stop reason: SURG

## 2019-02-15 RX ORDER — ROCURONIUM BROMIDE 10 MG/ML
INJECTION, SOLUTION INTRAVENOUS AS NEEDED
Status: DISCONTINUED | OUTPATIENT
Start: 2019-02-15 | End: 2019-02-15 | Stop reason: SURG

## 2019-02-15 RX ORDER — SODIUM CHLORIDE 9 MG/ML
INJECTION, SOLUTION INTRAVENOUS AS NEEDED
Status: DISCONTINUED | OUTPATIENT
Start: 2019-02-15 | End: 2019-02-15 | Stop reason: HOSPADM

## 2019-02-15 RX ORDER — ONDANSETRON 2 MG/ML
4 INJECTION INTRAMUSCULAR; INTRAVENOUS ONCE AS NEEDED
Status: DISCONTINUED | OUTPATIENT
Start: 2019-02-15 | End: 2019-02-15 | Stop reason: HOSPADM

## 2019-02-15 RX ORDER — SODIUM CHLORIDE 9 MG/ML
125 INJECTION, SOLUTION INTRAVENOUS CONTINUOUS
Status: DISCONTINUED | OUTPATIENT
Start: 2019-02-15 | End: 2019-02-15 | Stop reason: HOSPADM

## 2019-02-15 RX ORDER — NEOSTIGMINE METHYLSULFATE 1 MG/ML
INJECTION INTRAVENOUS AS NEEDED
Status: DISCONTINUED | OUTPATIENT
Start: 2019-02-15 | End: 2019-02-15 | Stop reason: SURG

## 2019-02-15 RX ORDER — ONDANSETRON 2 MG/ML
INJECTION INTRAMUSCULAR; INTRAVENOUS AS NEEDED
Status: DISCONTINUED | OUTPATIENT
Start: 2019-02-15 | End: 2019-02-15 | Stop reason: SURG

## 2019-02-15 RX ORDER — PROPOFOL 10 MG/ML
INJECTION, EMULSION INTRAVENOUS AS NEEDED
Status: DISCONTINUED | OUTPATIENT
Start: 2019-02-15 | End: 2019-02-15 | Stop reason: SURG

## 2019-02-15 RX ORDER — ONDANSETRON 2 MG/ML
4 INJECTION INTRAMUSCULAR; INTRAVENOUS EVERY 4 HOURS PRN
Status: DISCONTINUED | OUTPATIENT
Start: 2019-02-15 | End: 2019-02-15 | Stop reason: HOSPADM

## 2019-02-15 RX ORDER — HYDROMORPHONE HCL/PF 1 MG/ML
0.5 SYRINGE (ML) INJECTION
Status: DISCONTINUED | OUTPATIENT
Start: 2019-02-15 | End: 2019-02-15 | Stop reason: HOSPADM

## 2019-02-15 RX ORDER — GLYCOPYRROLATE 0.2 MG/ML
INJECTION INTRAMUSCULAR; INTRAVENOUS AS NEEDED
Status: DISCONTINUED | OUTPATIENT
Start: 2019-02-15 | End: 2019-02-15 | Stop reason: SURG

## 2019-02-15 RX ORDER — MAGNESIUM HYDROXIDE 1200 MG/15ML
LIQUID ORAL AS NEEDED
Status: DISCONTINUED | OUTPATIENT
Start: 2019-02-15 | End: 2019-02-15 | Stop reason: HOSPADM

## 2019-02-15 RX ORDER — CLINDAMYCIN PHOSPHATE 900 MG/50ML
900 INJECTION INTRAVENOUS ONCE
Status: COMPLETED | OUTPATIENT
Start: 2019-02-15 | End: 2019-02-15

## 2019-02-15 RX ORDER — FENTANYL CITRATE 50 UG/ML
INJECTION, SOLUTION INTRAMUSCULAR; INTRAVENOUS AS NEEDED
Status: DISCONTINUED | OUTPATIENT
Start: 2019-02-15 | End: 2019-02-15 | Stop reason: SURG

## 2019-02-15 RX ORDER — FENTANYL CITRATE/PF 50 MCG/ML
25 SYRINGE (ML) INJECTION
Status: DISCONTINUED | OUTPATIENT
Start: 2019-02-15 | End: 2019-02-15 | Stop reason: HOSPADM

## 2019-02-15 RX ADMIN — HYDROMORPHONE HYDROCHLORIDE 0.5 MG: 1 INJECTION, SOLUTION INTRAMUSCULAR; INTRAVENOUS; SUBCUTANEOUS at 11:39

## 2019-02-15 RX ADMIN — GLYCOPYRROLATE 0.4 MG: 0.2 INJECTION, SOLUTION INTRAMUSCULAR; INTRAVENOUS at 10:05

## 2019-02-15 RX ADMIN — PROPOFOL 150 MG: 10 INJECTION, EMULSION INTRAVENOUS at 09:18

## 2019-02-15 RX ADMIN — FENTANYL CITRATE 50 MCG: 50 INJECTION INTRAMUSCULAR; INTRAVENOUS at 09:18

## 2019-02-15 RX ADMIN — ROCURONIUM BROMIDE 30 MG: 10 INJECTION INTRAVENOUS at 09:18

## 2019-02-15 RX ADMIN — FENTANYL CITRATE 25 MCG: 50 INJECTION, SOLUTION INTRAMUSCULAR; INTRAVENOUS at 10:33

## 2019-02-15 RX ADMIN — DEXAMETHASONE SODIUM PHOSPHATE 8 MG: 4 INJECTION, SOLUTION INTRAMUSCULAR; INTRAVENOUS at 09:21

## 2019-02-15 RX ADMIN — SODIUM CHLORIDE: 0.9 INJECTION, SOLUTION INTRAVENOUS at 09:10

## 2019-02-15 RX ADMIN — EPHEDRINE SULFATE 10 MG: 50 INJECTION, SOLUTION INTRAMUSCULAR; INTRAVENOUS; SUBCUTANEOUS at 09:43

## 2019-02-15 RX ADMIN — ONDANSETRON 4 MG: 2 INJECTION INTRAMUSCULAR; INTRAVENOUS at 11:53

## 2019-02-15 RX ADMIN — NEOSTIGMINE METHYLSULFATE 3 MG: 1 INJECTION INTRAVENOUS at 10:05

## 2019-02-15 RX ADMIN — ONDANSETRON 4 MG: 2 INJECTION INTRAMUSCULAR; INTRAVENOUS at 09:21

## 2019-02-15 RX ADMIN — FENTANYL CITRATE 25 MCG: 50 INJECTION, SOLUTION INTRAMUSCULAR; INTRAVENOUS at 10:38

## 2019-02-15 RX ADMIN — CLINDAMYCIN PHOSPHATE 900 MG: 900 INJECTION INTRAVENOUS at 09:14

## 2019-02-15 RX ADMIN — FENTANYL CITRATE 50 MCG: 50 INJECTION INTRAMUSCULAR; INTRAVENOUS at 09:36

## 2019-02-15 NOTE — ANESTHESIA POSTPROCEDURE EVALUATION
Post-Op Assessment Note    CV Status:  Stable  Pain Score: 1    Pain management: adequate     Mental Status:  Alert and awake   Hydration Status:  Euvolemic   PONV Controlled:  Controlled   Airway Patency:  Patent   Post Op Vitals Reviewed: Yes      Staff: Anesthesiologist           BP      Temp      Pulse     Resp      SpO2

## 2019-02-15 NOTE — OP NOTE
OPERATIVE REPORT  PATIENT NAME: Becca Cramer    :  1945  MRN: [id number]  Pt Location: [facility] OR [facility] 03    SURGERY DATE: 2/15/2019    Surgeon(s) and Role:     Hansel Doty MD - Primary     * Homero Arboleda PA-C - Assisting    Preop Diagnosis:  Calculus of gallbladder with chronic cholecystitis without obstruction [K80 10]    Post-Op Diagnosis Codes:     * Calculus of gallbladder with chronic cholecystitis without obstruction [K80 10]    Procedure(s) (LRB):  CHOLECYSTECTOMY LAPAROSCOPIC (N/A)    Specimen(s):  ID Type Source Tests Collected by Time Destination   1 :  Tissue Gallbladder TISSUE EXAM Mariseal Disla MD 2/15/2019 7481        Estimated Blood Loss:   Minimal    Drains:  * No LDAs found *    Anesthesia Type:   General    Operative Indications:  Calculus of gallbladder with chronic cholecystitis without obstruction [K80 10]      Operative Findings:  Independent, nonsmoker, ASA 2, wound class 2, BMI 35, weight 200, height 63    Cardiovascular  Exercise tolerance (METS): >4,      Pulmonary         GI/Hepatic                Endo/Other       GYN     Hysterectomy, Breast cancer Right mastectomy and left lumpectomy         Hematology    Musculoskeletal         Neurology    Psychology           Complications:   None    Procedure and Technique:  Patient was identified visually and via armband  Placed in the supine position  After general anesthesia the abdomen was prepped and draped in a sterile fashion  0 25% Marcaine with epinephrine was utilized throughout the procedure  Incision was made at the umbilicus  This carried down through skin subcutaneous tissue  Under direct vision a 10 mm trocar was inserted followed by CO2 insufflation back pressure 15  Three additional trocars were then placed in the upper aspect of the abdomen under direct vision  The gallbladder was thick-walled and distended  Puncturing the gallbladder revealed hydrops bile  Indicative of a chronic obstruction      A dome down technique using electrocautery was utilized  This carried down to Lg's pouch  Blunt dissection was then used to obtain the critical view with exposure of the cystic duct and cystic artery  These structures were clipped x3 and then divided each  The gallbladder was then removed from liver bed using electrocautery  This was placed in Endo-Catch bag and removed through the umbilical incision  Fascia was closed with 0 Vicryl suture followed by 4 Monocryl suture and Dermabond  The patient tolerated this procedure well  Sponge and instrument count correct  Please note that Cain victor  Assisted with surgery as no qualified surgical resident is available  Her assistance was needed for exposure and retraction       I was present for the entire procedure    Patient Disposition:  PACU     SIGNATURE: Shirley Vela MD  DATE: February 15, 2019  TIME: 10:45 AM

## 2019-02-15 NOTE — DISCHARGE INSTRUCTIONS
Diet and activity as tolerated  May shower  May drive when not taking pain medication  Please call [phone] for a follow-up office visit for 2 weeks

## 2019-02-15 NOTE — ANESTHESIA PREPROCEDURE EVALUATION
Review of Systems/Medical History  Patient summary reviewed  Chart reviewed  History of anesthetic complications PONV    Cardiovascular  Exercise tolerance (METS): >4,     Pulmonary       GI/Hepatic            Endo/Other     GYN    Hysterectomy, Breast cancer Right mastectomy and left lumpectomy       Hematology   Musculoskeletal       Neurology   Psychology           Physical Exam    Airway    Mallampati score: II  TM Distance: >3 FB  Neck ROM: full     Dental   lower dentures and upper dentures,     Cardiovascular  Cardiovascular exam normal    Pulmonary  Pulmonary exam normal     Other Findings        Anesthesia Plan  ASA Score- 2     Anesthesia Type- general with ASA Monitors  Additional Monitors:   Airway Plan: ETT  Plan Factors-    Induction- intravenous  Postoperative Plan- Plan for postoperative opioid use  Informed Consent- Anesthetic plan and risks discussed with patient

## 2019-07-15 ENCOUNTER — OFFICE VISIT (OUTPATIENT)
Dept: INTERNAL MEDICINE CLINIC | Facility: CLINIC | Age: 74
End: 2019-07-15
Payer: COMMERCIAL

## 2019-07-15 VITALS
HEART RATE: 73 BPM | OXYGEN SATURATION: 98 % | BODY MASS INDEX: 34.27 KG/M2 | HEIGHT: 63 IN | DIASTOLIC BLOOD PRESSURE: 74 MMHG | SYSTOLIC BLOOD PRESSURE: 124 MMHG | WEIGHT: 193.4 LBS

## 2019-07-15 DIAGNOSIS — F41.9 ANXIETY: ICD-10-CM

## 2019-07-15 DIAGNOSIS — E78.2 MIXED HYPERLIPIDEMIA: ICD-10-CM

## 2019-07-15 DIAGNOSIS — K80.20 CALCULUS OF GALLBLADDER WITHOUT CHOLECYSTITIS WITHOUT OBSTRUCTION: ICD-10-CM

## 2019-07-15 DIAGNOSIS — M47.816 LUMBAR SPONDYLOSIS: Primary | ICD-10-CM

## 2019-07-15 DIAGNOSIS — C50.912 BREAST CANCER, STAGE 1, LEFT (HCC): ICD-10-CM

## 2019-07-15 DIAGNOSIS — C55 MALIGNANT NEOPLASM OF UTERUS, UNSPECIFIED SITE (HCC): ICD-10-CM

## 2019-07-15 DIAGNOSIS — I83.11 VARICOSE VEINS OF RIGHT LOWER EXTREMITY WITH INFLAMMATION: ICD-10-CM

## 2019-07-15 DIAGNOSIS — Z23 NEED FOR VACCINATION: ICD-10-CM

## 2019-07-15 PROCEDURE — 99214 OFFICE O/P EST MOD 30 MIN: CPT | Performed by: INTERNAL MEDICINE

## 2019-07-15 PROCEDURE — G0009 ADMIN PNEUMOCOCCAL VACCINE: HCPCS

## 2019-07-15 PROCEDURE — 90670 PCV13 VACCINE IM: CPT

## 2019-07-15 RX ORDER — ALPRAZOLAM 0.25 MG/1
0.25 TABLET ORAL DAILY PRN
Qty: 30 TABLET | Refills: 0 | Status: SHIPPED | OUTPATIENT
Start: 2019-07-15 | End: 2019-09-05 | Stop reason: SDUPTHER

## 2019-07-15 RX ORDER — GABAPENTIN 300 MG/1
300 CAPSULE ORAL 2 TIMES DAILY
Qty: 180 CAPSULE | Refills: 1 | Status: SHIPPED | OUTPATIENT
Start: 2019-07-15 | End: 2020-03-30 | Stop reason: SDUPTHER

## 2019-07-15 NOTE — ASSESSMENT & PLAN NOTE
She prefers to see spine surgery instead of going back to pain mgt at this point  Continue gabapentin
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
## 2019-07-24 ENCOUNTER — TELEPHONE (OUTPATIENT)
Dept: OBGYN CLINIC | Facility: HOSPITAL | Age: 74
End: 2019-07-24

## 2019-07-24 ENCOUNTER — OFFICE VISIT (OUTPATIENT)
Dept: OBGYN CLINIC | Facility: CLINIC | Age: 74
End: 2019-07-24
Payer: COMMERCIAL

## 2019-07-24 ENCOUNTER — APPOINTMENT (OUTPATIENT)
Dept: LAB | Age: 74
End: 2019-07-24
Payer: COMMERCIAL

## 2019-07-24 VITALS
WEIGHT: 193 LBS | SYSTOLIC BLOOD PRESSURE: 128 MMHG | DIASTOLIC BLOOD PRESSURE: 96 MMHG | BODY MASS INDEX: 35.51 KG/M2 | HEIGHT: 62 IN | HEART RATE: 77 BPM

## 2019-07-24 DIAGNOSIS — M47.816 LUMBAR SPONDYLOSIS: ICD-10-CM

## 2019-07-24 DIAGNOSIS — M48.062 SPINAL STENOSIS OF LUMBAR REGION WITH NEUROGENIC CLAUDICATION: Primary | ICD-10-CM

## 2019-07-24 LAB
ALBUMIN SERPL BCP-MCNC: 4 G/DL (ref 3.5–5)
ALP SERPL-CCNC: 82 U/L (ref 46–116)
ALT SERPL W P-5'-P-CCNC: 18 U/L (ref 12–78)
ANION GAP SERPL CALCULATED.3IONS-SCNC: 3 MMOL/L (ref 4–13)
AST SERPL W P-5'-P-CCNC: 9 U/L (ref 5–45)
BASOPHILS # BLD MANUAL: 0.11 THOUSAND/UL (ref 0–0.1)
BASOPHILS NFR MAR MANUAL: 1 % (ref 0–1)
BILIRUB SERPL-MCNC: 0.9 MG/DL (ref 0.2–1)
BUN SERPL-MCNC: 12 MG/DL (ref 5–25)
CALCIUM SERPL-MCNC: 9.7 MG/DL (ref 8.3–10.1)
CHLORIDE SERPL-SCNC: 106 MMOL/L (ref 100–108)
CHOLEST SERPL-MCNC: 236 MG/DL (ref 50–200)
CO2 SERPL-SCNC: 28 MMOL/L (ref 21–32)
CREAT SERPL-MCNC: 1.05 MG/DL (ref 0.6–1.3)
EOSINOPHIL # BLD MANUAL: 0.11 THOUSAND/UL (ref 0–0.4)
EOSINOPHIL NFR BLD MANUAL: 1 % (ref 0–6)
ERYTHROCYTE [DISTWIDTH] IN BLOOD BY AUTOMATED COUNT: 14.1 % (ref 11.6–15.1)
GFR SERPL CREATININE-BSD FRML MDRD: 60 ML/MIN/1.73SQ M
GLUCOSE P FAST SERPL-MCNC: 91 MG/DL (ref 65–99)
HCT VFR BLD AUTO: 42.7 % (ref 34.8–46.1)
HDLC SERPL-MCNC: 53 MG/DL (ref 40–60)
HGB BLD-MCNC: 13.9 G/DL (ref 11.5–15.4)
LDLC SERPL CALC-MCNC: 165 MG/DL (ref 0–100)
LYMPHOCYTES # BLD AUTO: 35 % (ref 14–44)
LYMPHOCYTES # BLD AUTO: 4 THOUSAND/UL (ref 0.6–4.47)
MCH RBC QN AUTO: 28.8 PG (ref 26.8–34.3)
MCHC RBC AUTO-ENTMCNC: 32.6 G/DL (ref 31.4–37.4)
MCV RBC AUTO: 89 FL (ref 82–98)
MONOCYTES # BLD AUTO: 0.91 THOUSAND/UL (ref 0–1.22)
MONOCYTES NFR BLD: 8 % (ref 4–12)
NEUTROPHILS # BLD MANUAL: 6.28 THOUSAND/UL (ref 1.85–7.62)
NEUTS SEG NFR BLD AUTO: 55 % (ref 43–75)
NONHDLC SERPL-MCNC: 183 MG/DL
NRBC BLD AUTO-RTO: 0 /100 WBCS
PLATELET # BLD AUTO: 236 THOUSANDS/UL (ref 149–390)
PLATELET BLD QL SMEAR: ADEQUATE
PMV BLD AUTO: 11 FL (ref 8.9–12.7)
POTASSIUM SERPL-SCNC: 4.2 MMOL/L (ref 3.5–5.3)
PROT SERPL-MCNC: 8.5 G/DL (ref 6.4–8.2)
RBC # BLD AUTO: 4.82 MILLION/UL (ref 3.81–5.12)
SODIUM SERPL-SCNC: 137 MMOL/L (ref 136–145)
TRIGL SERPL-MCNC: 92 MG/DL
WBC # BLD AUTO: 11.42 THOUSAND/UL (ref 4.31–10.16)

## 2019-07-24 PROCEDURE — 85027 COMPLETE CBC AUTOMATED: CPT | Performed by: INTERNAL MEDICINE

## 2019-07-24 PROCEDURE — 80053 COMPREHEN METABOLIC PANEL: CPT | Performed by: INTERNAL MEDICINE

## 2019-07-24 PROCEDURE — 99204 OFFICE O/P NEW MOD 45 MIN: CPT | Performed by: ORTHOPAEDIC SURGERY

## 2019-07-24 PROCEDURE — 85007 BL SMEAR W/DIFF WBC COUNT: CPT | Performed by: INTERNAL MEDICINE

## 2019-07-24 PROCEDURE — 36415 COLL VENOUS BLD VENIPUNCTURE: CPT | Performed by: INTERNAL MEDICINE

## 2019-07-24 PROCEDURE — 80061 LIPID PANEL: CPT | Performed by: INTERNAL MEDICINE

## 2019-07-24 RX ORDER — METHYLPREDNISOLONE 4 MG/1
TABLET ORAL
Qty: 21 TABLET | Refills: 0 | Status: SHIPPED | OUTPATIENT
Start: 2019-07-24 | End: 2019-07-24 | Stop reason: ALTCHOICE

## 2019-07-24 RX ORDER — METHYLPREDNISOLONE 4 MG/1
TABLET ORAL
Qty: 21 TABLET | Refills: 0 | Status: SHIPPED | OUTPATIENT
Start: 2019-07-24 | End: 2019-07-29 | Stop reason: ALTCHOICE

## 2019-07-24 NOTE — PROGRESS NOTES
74 y o female presents for new evaluation of lumbar back pain with radiculopathy  Patient states that she is suffering from lower back pain for the past couple years, though she has had interval worsening of the back pain over the past several months  Patient states that she could formally walk several labs through the mall without difficulty, but recently she was unable to complete 1 lap and required assistance to get to her car  She finds that she is holding onto objects around the house to keep her steady as she ambulates though she does not use a formal assistive device  Patient states that the pain originates in her lower back and radiates down the posterior aspect of both legs as well as to the groin bilaterally  She denies any weakness or numbness of either lower extremity  She has been seen by Dr Marcia Adan had Pain Management who recommended radiofrequency ablation, but patient wanted a 2nd surgical opinion before proceeding with this mode of therapy  She denies any bowel/bladder dysfunction or constitutional symptoms  She has no other complaints at this time      Review of Systems  Review of systems negative unless otherwise specified in HPI    Past Medical History  Past Medical History:   Diagnosis Date    [facility] )     B/L    Endometrial cancer [facility] )     PONV (postoperative nausea and vomiting)        Past Surgical History  Past Surgical History:   Procedure Laterality Date    BREAST LUMPECTOMY Left     BREAST RECONSTRUCTION Right 2010    COLONOSCOPY      JOINT REPLACEMENT Bilateral     Knees    KNEE ARTHROSCOPY Bilateral     MASTECTOMY Right     2003    ORTHOPEDIC SURGERY Bilateral     knee arthoscopy    AL LAP,CHOLECYSTECTOMY N/A 2/15/2019    Procedure: CHOLECYSTECTOMY LAPAROSCOPIC;  Surgeon: Robert Calvert MD;  [facility];  Service: General    RADICAL ABDOMINAL HYSTERECTOMY      REDUCTION MAMMAPLASTY Left        Current Medications  Current Outpatient Medications on File Prior to Visit   Medication Sig Dispense Refill    ALPRAZolam (XANAX) 0 25 mg tablet Take 1 tablet (0 25 mg total) by mouth daily as needed for anxiety 30 tablet 0    Calcium Carbonate-Vitamin D (CALTRATE 600+D) 600-400 MG-UNIT per tablet Take 1 tablet by mouth 2 (two) times a day       gabapentin (NEURONTIN) 300 mg capsule Take 1 capsule (300 mg total) by mouth 2 (two) times a day 180 capsule 1     No current facility-administered medications on file prior to visit  Recent Labs (HCT,HGB,PT,INR,ESR,CRP,GLU,HgA1C)  No results found for: HCT, HGB, WBC, PT, INR, ESR, CRP, GLUCOSE, HGBA1C      Physical exam  · General: Awake, Alert, Oriented  · Eyes: Pupils equal, round and reactive to light  · Heart: regular rate and rhythm  · Lungs: No audible wheezing  · Abdomen: soft  Back exam  · Skin intact  · Positive Seth finger bilaterally over the SI joints  · Positive ZUNILDA on the right for SI pain  · 5/5 motor L2-S1 bilaterally  · [place] L2-S1 bilaterally  · Bilateral lower extremities warm and well-perfused  · Walks with an antalgic gait    Imaging  MRI of the lumbar spine was reviewed by myself and Dr Aline Ziegler  Reveals foraminal stenosis at L2-3, L3-4, L4-5 and L5-S1 primarily on the left  She also has grade 1 spondylolisthesis of L4 over L11   26-year-old female with lumbar stenosis  · At this time, we would recommend the patient continue with her conservative modes of treatment with Dr Joy Listen  We do recommend that she obtain epidural steroid injections targeted to L3-4 and L4-5 prior to undergoing any attempts at radiofrequency ablation, the patient may require both these modalities eventually  We ask that she obtain these injections as soon as possible as she is attending a wedding in the 1st week of August   Patient informed that she may return in 6 weeks after undergoing this procedure to evaluate its efficacy    Should the patient fail conservative management, future surgical intervention may be discussed    · Please complete Medrol Dosepak as directed  · Patient understands and agrees with plan above

## 2019-07-24 NOTE — TELEPHONE ENCOUNTER
Patient is calling for instruction on how to take the medication prescribed today     methylPREDNISolone 4 MG tablet therapy pack [[id number]]     Patient claims there is no instruction included with the medication        Please call the patient back with proper dosing      Call back [facility] [phone]

## 2019-07-29 ENCOUNTER — HOSPITAL ENCOUNTER (OUTPATIENT)
Dept: RADIOLOGY | Facility: MEDICAL CENTER | Age: 74
Discharge: HOME | End: 2019-07-29
Attending: PHYSICAL MEDICINE & REHABILITATION
Payer: COMMERCIAL

## 2019-07-29 VITALS
TEMPERATURE: 97.9 F | OXYGEN SATURATION: 98 % | HEART RATE: 81 BPM | RESPIRATION RATE: 18 BRPM | DIASTOLIC BLOOD PRESSURE: 83 MMHG | SYSTOLIC BLOOD PRESSURE: 143 MMHG

## 2019-07-29 DIAGNOSIS — M48.062 SPINAL STENOSIS, LUMBAR REGION, WITH NEUROGENIC CLAUDICATION: ICD-10-CM

## 2019-07-29 PROCEDURE — 64484 NJX AA&/STRD TFRM EPI L/S EA: CPT | Performed by: PHYSICAL MEDICINE & REHABILITATION

## 2019-07-29 PROCEDURE — 64483 NJX AA&/STRD TFRM EPI L/S 1: CPT | Performed by: PHYSICAL MEDICINE & REHABILITATION

## 2019-07-29 RX ORDER — LIDOCAINE HYDROCHLORIDE 10 MG/ML
5 INJECTION, SOLUTION EPIDURAL; INFILTRATION; INTRACAUDAL; PERINEURAL ONCE
Status: COMPLETED | OUTPATIENT
Start: 2019-07-29 | End: 2019-07-29

## 2019-07-29 RX ORDER — PAPAVERINE HCL 150 MG
20 CAPSULE, EXTENDED RELEASE ORAL ONCE
Status: COMPLETED | OUTPATIENT
Start: 2019-07-29 | End: 2019-07-29

## 2019-07-29 RX ADMIN — LIDOCAINE HYDROCHLORIDE 2.5 ML: 10 INJECTION, SOLUTION EPIDURAL; INFILTRATION; INTRACAUDAL; PERINEURAL at 15:21

## 2019-07-29 RX ADMIN — IOHEXOL 2 ML: 300 INJECTION, SOLUTION INTRAVENOUS at 15:24

## 2019-07-29 RX ADMIN — DEXAMETHASONE SODIUM PHOSPHATE 15 MG: 10 INJECTION, SOLUTION INTRAMUSCULAR; INTRAVENOUS at 15:24

## 2019-07-29 NOTE — DISCHARGE INSTRUCTIONS
Epidural Steroid Injection   WHAT YOU NEED TO KNOW:   An epidural steroid injection (CAMILLE) is a procedure to inject steroid medicine into the epidural space  The epidural space is between your spinal cord and vertebrae  Steroids reduce inflammation and fluid buildup in your spine that may be causing pain  You may be given pain medicine along with the steroids  ACTIVITY  · Do not drive or operate machinery today  · No strenuous activity today - bending, lifting, etc   · You may resume normal activites starting tomorrow - start slowly and as tolerated  · You may shower today, but no tub baths or hot tubs  · You may have numbness for several hours from the local anesthetic  Please use caution and common sense, especially with weight-bearing activities  CARE OF THE INJECTION SITE  · If you have soreness or pain, apply ice to the area today (20 minutes on/20 minutes off)  · Starting tomorrow, you may use warm, moist heat or ice if needed  · You may have an increase or change in your discomfort for 36-48 hours after your treatment  · Apply ice and continue with any pain medication you have been prescribed  · Notify [facility] if you have any of the following: redness, drainage, swelling, headache, stiff neck or fever above 100°F     SPECIAL INSTRUCTIONS  · Our office will contact you in approximately 7 days for a progress report  MEDICATIONS  · Continue to take all routine medications  · Our office may have instructed you to hold some medications  If you have a problem specifically related to your procedure, please call our office at [phone]  Problems not related to your procedure should be directed to your primary care physician

## 2019-07-29 NOTE — H&P
History of Present Illness:  The patient is a 75 y o  female who presents with complaints of back and left leg pain    Patient Active Problem List   Diagnosis    Breast cancer, stage 1, left ([facility] )    Uterine cancer ([facility] )    History of breast cancer    Pulmonary nodule    Lumbar spondylosis    Spinal stenosis of lumbar region with neurogenic claudication       Past Medical History:   Diagnosis Date    [facility] )     B/L    Endometrial cancer [facility]     PONV (postoperative nausea and vomiting)        Past Surgical History:   Procedure Laterality Date    BREAST LUMPECTOMY Left     BREAST RECONSTRUCTION Right 2010    COLONOSCOPY      JOINT REPLACEMENT Bilateral     Knees    KNEE ARTHROSCOPY Bilateral     MASTECTOMY Right     2003    ORTHOPEDIC SURGERY Bilateral     knee arthoscopy    IN LAP,CHOLECYSTECTOMY N/A 2/15/2019    Procedure: CHOLECYSTECTOMY LAPAROSCOPIC;  Surgeon: Jayson Stack MD;  [facility];  Service: General    RADICAL ABDOMINAL HYSTERECTOMY      REDUCTION MAMMAPLASTY Left          Current Outpatient Medications:     ALPRAZolam (XANAX) 0 25 mg tablet, Take 1 tablet (0 25 mg total) by mouth daily as needed for anxiety, Disp: 30 tablet, Rfl: 0    Calcium Carbonate-Vitamin D (CALTRATE 600+D) 600-400 MG-UNIT per tablet, Take 1 tablet by mouth 2 (two) times a day , Disp: , Rfl:     gabapentin (NEURONTIN) 300 mg capsule, Take 1 capsule (300 mg total) by mouth 2 (two) times a day, Disp: 180 capsule, Rfl: 1    Current Facility-Administered Medications:     dexamethasone (PF) (DECADRON) injection 20 mg, 20 mg, Epidural, Once, Jaime Cadet DO    iohexol (OMNIPAQUE) 300 mg/mL injection 50 mL, 50 mL, Epidural, Once, Jaime Cadet DO    lidocaine (PF) (XYLOCAINE-MPF) 1 % injection 5 mL, 5 mL, Infiltration, Once, Jaime Cadet DO    Allergies   Allergen Reactions    Latex Itching and Rash    Penicillin V Rash    Other Other (See Comments)     rash    Aspirin GI Intolerance    Oxycodone-Acetaminophen GI Intolerance and Headache       Physical Exam:   Vitals:    07/29/19 1503   BP: 146/81   Pulse: 85   Resp: 18   Temp: 97 9 °F (36 6 °C)   SpO2: 96%     General: Awake, Alert, Oriented x 3, Mood and affect appropriate  Respiratory: Respirations even and unlabored  Cardiovascular: Peripheral pulses intact; no edema  Musculoskeletal Exam:  Back and left leg pain    ASA Score:  2  Patient/Chart Verification  Patient ID Verified: Verbal  Consents Confirmed: Procedural, To be obtained in the Pre-Procedure area  H&P( within 30 days) Verified: To be obtained in the Pre-Procedure area  Allergies Reviewed: Yes  Anticoag/NSAID held?: NA  Currently on antibiotics?: No  Pregnancy denied?: NA    Assessment:   1   Spinal stenosis, lumbar region, with neurogenic claudication        Plan: LT L4-5 AND L5-S1 TFCAMILLE - Be (pt known to us)

## 2019-08-05 ENCOUNTER — TELEPHONE (OUTPATIENT)
Dept: PAIN MEDICINE | Facility: CLINIC | Age: 74
End: 2019-08-05

## 2019-08-07 NOTE — TELEPHONE ENCOUNTER
Patient is calling back with an update since injection  Patient is able to walk however there is still some pain  She was able to get around, her pain level is a 5-6/10  The injection helped about 50%   The pain is manageable

## 2019-08-13 PROBLEM — I83.891 SYMPTOMATIC VARICOSE VEINS OF RIGHT LOWER EXTREMITY: Status: ACTIVE | Noted: 2019-08-13

## 2019-08-13 NOTE — PROGRESS NOTES
Symptomatic varicose veins of both lower extremities  41-year-old female with a history bilateral breast CA, s/p L mastectomy w/TRAM flap, s/p R lumpectomy, uterine CA, symptomatic lumbar spondylosis and symptomatic BLE superficial varicose veins with symptoms of venous incompetence, R>L  Patient does not wear compression  -recommend 3 month trial of conservative measures to include daily use of compression stockings, lower extremity elevation, low-sodium diet, aerobic activity, weight management and skin moisturization  -LEVDR in 3 months  -return to office with surgeon in 3 months with LEVDR for re-evaluation and discussion of surgical options  -instructed to contact the office in the interim with any questions, concerns or new symptoms    Lumbar spondylosis  -continue follow-up with spine surgeon      Assessment/Plan   Diagnoses and all orders for this visit:    Symptomatic varicose veins of both lower extremities  -     Compression Stocking  -     VAS reflux lower limb venous duplex study with reflux assessment, complete bilateral; Future    Lumbar spondylosis    Varicose veins of right lower extremity with inflammation  -     Ambulatory referral to Vascular Surgery        Chief Complaint   Patient presents with    Varicose Veins     Bilateral Legs       Subjective   Patient ID: Libia Bell is a 75 y o  female  Pt is new to our practice and was referred by Dr Iza Freeman MD for evaluation of varicose veins  Pt c/o of bilateral varicose vein in her legs for the past 38 years  Veins bulge and are painful  Veins also become discolored  Pt does elevate her legs but does not use compression stockings  Pt has had no testing for this condition  Pt is not taking any blood thinning medications or statins      41-year-old female with a history bilateral breast CA, s/p L mastectomy w/TRAM flap, s/p R lumpectomy, uterine CA, symptomatic lumbar spondylosis and longstanding symptomatic BLE superficial varicose veins with symptoms of venous incompetence, R>L, who is referred by her PCP for evaluation varicose veins  Patient reports development of superficial varicose veins 38 years ago after her last pregnancy  Her varicose veins and her symptoms have been progressive over the years  She was evaluated by vascular surgery at [facility] years ago who did not recommend intervention as it was felt it would just because medic  The patient complains lower extremity swelling, heaviness, burning, stinging, aching and pruritus, much more pronounced on right lower extremity  She denies history DVT, PE, hypercoagulable disorder, recurrent lower extremity cellulitis, venous stasis dermatitis, superficial thrombophlebitis or bleeding varicosities  Patient reports a significant family history of varicose veins in her mother who underwent vein stripping  She denies family history of VTE or hypercoagulable disorder  Patient does not wear compression  The following portions of the patient's history were reviewed and updated as appropriate: allergies, current medications, past family history, past medical history, past social history, past surgical history and problem list     Review of Systems   Constitutional: Negative  HENT: Negative  Eyes: Negative  Respiratory: Negative  Cardiovascular: Negative  Gastrointestinal: Negative  Endocrine: Negative  Genitourinary: Negative  Musculoskeletal: Positive for back pain and gait problem  Leg pain and cramping with walking   Skin: Negative  Allergic/Immunologic: Negative  Neurological: Negative for dizziness, weakness, light-headedness and headaches  Hematological: Negative  Psychiatric/Behavioral: Negative  I have personally reviewed the ROS entered by MA and agree as documented      Patient Active Problem List   Diagnosis    Breast cancer, stage 1, left ([facility] [address] )    Uterine cancer ([facility] [address] )    History of breast cancer    Pulmonary nodule    Lumbar spondylosis    Spinal stenosis, lumbar region, with neurogenic claudication    Symptomatic varicose veins of both lower extremities       Past Surgical History:   Procedure Laterality Date    BREAST LUMPECTOMY Left     BREAST RECONSTRUCTION Right 2010    COLONOSCOPY      JOINT REPLACEMENT Bilateral     Knees    KNEE ARTHROSCOPY Bilateral     MASTECTOMY Right     2003    ORTHOPEDIC SURGERY Bilateral     knee arthoscopy    MN LAP,CHOLECYSTECTOMY N/A 2/15/2019    Procedure: CHOLECYSTECTOMY LAPAROSCOPIC;  Surgeon: Yen Kenny MD;  Location: [facility];  Service: General    RADICAL ABDOMINAL HYSTERECTOMY      REDUCTION MAMMAPLASTY Left        Family History   Problem Relation Age of Onset    Stroke Mother     Hypertension Mother     Cancer Father        Social History     Socioeconomic History    Marital status:       Spouse name: Not on file    Number of children: Not on file    Years of education: Not on file    Highest education level: Not on file   Occupational History    Occupation: retired   Social Needs    Financial resource strain: Not on file    Food insecurity:     Worry: Not on file     Inability: Not on file   [employer] needs:     Medical: Not on file     Non-medical: Not on file   Tobacco Use    Smoking status: Never Smoker    Smokeless tobacco: Never Used   Substance and Sexual Activity    Alcohol use: No    Drug use: No    Sexual activity: Never   Lifestyle    Physical activity:     Days per week: Not on file     Minutes per session: Not on file    Stress: Not on file   Relationships    Social connections:     Talks on phone: Not on file     Gets together: Not on file     Attends Yazidism service: Not on file     Active member of club or organization: Not on file     Attends meetings of clubs or organizations: Not on file     Relationship status: Not on file    Intimate partner violence:     Fear of current or ex partner: Not on file     Emotionally abused: Not on file     Physically abused: Not on file     Forced sexual activity: Not on file   Other Topics Concern    Not on file   Social History Narrative    Lives with adult daughter       Allergies   Allergen Reactions    Latex Itching and Rash    Penicillin V Rash    Other Other (See Comments)     rash    Aspirin GI Intolerance    Oxycodone-Acetaminophen GI Intolerance and Headache         Current Outpatient Medications:     ALPRAZolam (XANAX) 0 25 mg tablet, Take 1 tablet (0 25 mg total) by mouth daily as needed for anxiety, Disp: 30 tablet, Rfl: 0    Calcium Carbonate-Vitamin D (CALTRATE 600+D) 600-400 MG-UNIT per tablet, Take 1 tablet by mouth 2 (two) times a day , Disp: , Rfl:     gabapentin (NEURONTIN) 300 mg capsule, Take 1 capsule (300 mg total) by mouth 2 (two) times a day, Disp: 180 capsule, Rfl: 1    Objective      Imaging studies:  L EVD 12/23/2018 at [facility]:  No acute or chronic DVT or superficial thrombophlebitis    Physical Exam:    General appearance: alert and oriented, in no acute distress  Skin: Skin color, texture, turgor normal  No rashes or lesions  Neurologic: Grossly normal  Head: Normocephalic, without obvious abnormality, atraumatic  Eyes: PERRL, EOMI, sclera nonicteric  Throat: lips, mucosa, and tongue normal; teeth and gums normal  Neck: no adenopathy, no carotid bruit, no JVD, supple, symmetrical, trachea midline and thyroid not enlarged, symmetric, no tenderness/mass/nodules  Back: symmetric, no curvature  ROM normal  No CVA tenderness  Lungs: clear to auscultation bilaterally  Chest wall: no tenderness  Heart: regular rate and rhythm, S1, S2 normal, no murmur, click, rub or gallop  Abdomen: soft, non-tender; bowel sounds normal; no masses,  no organomegaly and Obese but nondistended  No abdominal bruits  Extremities: Bilateral lower extremities warm, pink, well perfused and motor and sensory intact  Trace 1+ edema noted bilateral ankles    Superficial varicosities noted on the lateral aspect of bilateral distal thighs and cluster superficial varicosities on right lateral proximal calf  No hemosiderin staining  No lipodermatosclerosis or venous ulcerations      Pulse exam:  Radial: Right: 2+ Left[de-identified] 2+  DP: Right: 2+ Left: 2+  PT: Right: 2+ Left: 2+

## 2019-08-14 ENCOUNTER — CONSULT (OUTPATIENT)
Dept: VASCULAR SURGERY | Facility: CLINIC | Age: 74
End: 2019-08-14
Payer: COMMERCIAL

## 2019-08-14 VITALS
SYSTOLIC BLOOD PRESSURE: 126 MMHG | RESPIRATION RATE: 16 BRPM | HEART RATE: 68 BPM | HEIGHT: 62 IN | WEIGHT: 194 LBS | BODY MASS INDEX: 35.7 KG/M2 | DIASTOLIC BLOOD PRESSURE: 80 MMHG | TEMPERATURE: 97.3 F

## 2019-08-14 DIAGNOSIS — I83.11 VARICOSE VEINS OF RIGHT LOWER EXTREMITY WITH INFLAMMATION: ICD-10-CM

## 2019-08-14 DIAGNOSIS — M47.816 LUMBAR SPONDYLOSIS: ICD-10-CM

## 2019-08-14 DIAGNOSIS — I83.893 SYMPTOMATIC VARICOSE VEINS OF BOTH LOWER EXTREMITIES: Primary | ICD-10-CM

## 2019-08-14 PROCEDURE — 99204 OFFICE O/P NEW MOD 45 MIN: CPT | Performed by: PHYSICIAN ASSISTANT

## 2019-08-14 PROCEDURE — 4040F PNEUMOC VAC/ADMIN/RCVD: CPT | Performed by: PHYSICIAN ASSISTANT

## 2019-08-14 NOTE — PATIENT INSTRUCTIONS
Varicose Veins   WHAT YOU NEED TO KNOW:   What are varicose veins? Varicose veins are veins that become large, twisted, and swollen  They are common on the back of the calves, knees, and thighs  Varicose veins are caused by valves in your veins that do not work properly  This causes blood to collect and increase pressure in the veins of your legs  The increased pressure causes your veins to stretch, get larger, swell, and twist        What increases my risk for varicose veins? · Pregnancy    · A family history of varicose veins    · Being overweight or obese    · Age 48 years or older    · Sitting or standing for long periods of time    · Wearing tight clothing  What are the signs and symptoms of varicose veins? Your symptoms may be worse after you stand or sit for long periods of time  You may have any of the following:  · Blue, purple, or bulging veins in your legs     · Pain, swelling, or muscle cramps in your legs    · Feeling of fatigue or heaviness in your legs  How are varicose veins diagnosed? Your healthcare provider will examine your legs and ask about your medical history  You may need tests, such as a Doppler ultrasound or duplex scan  These tests show your veins and valves, and how your blood is flowing through them  These tests may also show if there is a blockage or blood clot  How are varicose veins treated? The goal of treatment is to decrease symptoms, improve appearance, and prevent further problems  Treatment will depend on which veins are affected and how severe your condition is  You may need procedures to treat or remove your varicose veins  For example, your healthcare provider may inject a solution or use a laser to close the varicose veins  Surgery to remove long veins may also be done  Ask your healthcare provider for more information about procedures used to treat varicose veins  What can I do to manage my symptoms? · Do not sit or stand for long periods of time    This can cause the blood to collect in your legs and make your symptoms worse  Bend or rotate your ankles several times every hour  Walk around for a few minutes every hour to get blood moving in your legs  · Do not cross your legs when you sit  This decreases blood flow to your feet and can make your symptoms worse  · Do not wear tight clothing or shoes  Do not wear high-heeled shoes  Do not wear clothes that are tight around the waist or knees  · Maintain a healthy weight  Being overweight or obese can make your varicose veins worse  Ask your healthcare provider how much you should weigh  Ask him or her to help you create a weight loss plan if you are overweight  · Wear pressure stockings as directed  The stockings are tight and put pressure on your legs  They improve blood flow and help prevent clots  · Elevate your legs  Keep them above the level of your heart for 15 to 30 minutes several times a day  You can also prop the end of your bed up slightly to elevate your legs while you sleep  This will help blood to flow back to your heart  · Get regular exercise  Talk to your healthcare provider about the best exercise plan for you  Exercise can improve blood flow to your legs and feet  When should I seek immediate care? · You have a wound that does not heal or is infected  · You have an injury that has broken your skin and caused your varicose veins to bleed  · Your leg is swollen and hard  · You notice that your legs or feet are turning blue or black  · Your leg feels warm, tender, and painful  It may look swollen and red  When should I contact my healthcare provider? · You have pain in your leg that does not go away or gets worse  · You notice sudden large bruising on your legs  · You have a rash on your leg  · Your symptoms keep you from doing your daily activities  · You have questions or concerns about your condition or care    CARE AGREEMENT:   You have the right to help plan your care  Learn about your health condition and how it may be treated  Discuss treatment options with your caregivers to decide what care you want to receive  You always have the right to refuse treatment  The above information is an  only  It is not intended as medical advice for individual conditions or treatments  Talk to your doctor, nurse or pharmacist before following any medical regimen to see if it is safe and effective for you  © 2017 [employer]  Information is for End User's use only and may not be sold, redistributed or otherwise used for commercial purposes  All illustrations and images included in CareNotes® are the copyrighted property of [employer]  or [employer]      -recommend 3 month trial of conservative measures to include daily use of prescription compression stockings, leg elevation, low-salt diet, aerobic activity, weight management and lotion to leg to help promote good skin health  -place your compression stockings on in the morning and remove prior to bed  -we will schedule you for a lower extremity venous reflux study to assess the function of your valves in your veins to help guide treatment options  -return to office with surgeon in 3 months after reflux study for re-evaluation   -please contact the office in the interim with any questions, concerns or new symptoms

## 2019-08-14 NOTE — ASSESSMENT & PLAN NOTE
59-year-old female with a history bilateral breast CA, s/p L mastectomy w/TRAM flap, s/p R lumpectomy, uterine CA, symptomatic lumbar spondylosis and symptomatic BLE superficial varicose veins with symptoms of venous incompetence, R>L  Patient does not wear compression    -recommend 3 month trial of conservative measures to include daily use of compression stockings, lower extremity elevation, low-sodium diet, aerobic activity, weight management and skin moisturization  -LEVDR in 3 months  -return to office with surgeon in 3 months with LEVDR for re-evaluation and discussion of surgical options  -instructed to contact the office in the interim with any questions, concerns or new symptoms

## 2019-09-05 DIAGNOSIS — F41.9 ANXIETY: ICD-10-CM

## 2019-09-08 RX ORDER — ALPRAZOLAM 0.25 MG/1
0.25 TABLET ORAL DAILY PRN
Qty: 30 TABLET | Refills: 0 | Status: SHIPPED | OUTPATIENT
Start: 2019-09-08 | End: 2020-03-30 | Stop reason: SDUPTHER

## 2019-12-03 ENCOUNTER — HOSPITAL ENCOUNTER (OUTPATIENT)
Dept: NON INVASIVE DIAGNOSTICS | Facility: CLINIC | Age: 74
Discharge: HOME | End: 2019-12-03
Payer: COMMERCIAL

## 2019-12-03 DIAGNOSIS — I83.893 SYMPTOMATIC VARICOSE VEINS OF BOTH LOWER EXTREMITIES: ICD-10-CM

## 2019-12-03 PROCEDURE — 93970 EXTREMITY STUDY: CPT

## 2019-12-04 PROCEDURE — 93970 EXTREMITY STUDY: CPT | Performed by: SURGERY

## 2019-12-19 ENCOUNTER — OFFICE VISIT (OUTPATIENT)
Dept: VASCULAR SURGERY | Facility: CLINIC | Age: 74
End: 2019-12-19
Payer: COMMERCIAL

## 2019-12-19 ENCOUNTER — TRANSCRIBE ORDERS (OUTPATIENT)
Dept: ADMINISTRATIVE | Facility: HOSPITAL | Age: 74
End: 2019-12-19

## 2019-12-19 VITALS
HEART RATE: 86 BPM | HEIGHT: 62 IN | SYSTOLIC BLOOD PRESSURE: 144 MMHG | BODY MASS INDEX: 37.17 KG/M2 | DIASTOLIC BLOOD PRESSURE: 92 MMHG | WEIGHT: 202 LBS

## 2019-12-19 DIAGNOSIS — I82.411 ACUTE DEEP VEIN THROMBOSIS (DVT) OF FEMORAL VEIN OF RIGHT LOWER EXTREMITY (HCC): ICD-10-CM

## 2019-12-19 DIAGNOSIS — I83.893 SYMPTOMATIC VARICOSE VEINS OF BOTH LOWER EXTREMITIES: Primary | ICD-10-CM

## 2019-12-19 PROCEDURE — 99214 OFFICE O/P EST MOD 30 MIN: CPT | Performed by: SURGERY

## 2019-12-19 PROCEDURE — 93970 EXTREMITY STUDY: CPT | Performed by: SURGERY

## 2019-12-19 NOTE — ASSESSMENT & PLAN NOTE
Right common femoral vein DVT incidentally identified on venous reflux study  We discussed this finding along with the associated risks and appropriate treatment  I have given her a starter pack for Xarelto which I will ask that she start today  I have asked her to continue anticoagulation for a total of 3 months at which time a repeat duplex will be performed and further recommendations will be made  I have also asked that she follow up with her primary care physician for evaluation  Of note she denies any inciting event but with her history of breast and endometrial cancer the possibility of hypercoagulability of malignancy has to be considered

## 2019-12-19 NOTE — LETTER
December 19, 2019     Azael Mcleod MD  [address]  [address] [place] [address]    Patient: Lilian Laura   YOB: 1945   Date of Visit: 12/19/2019       Dear Dr Yuli Pagan: Thank you for referring Lilian Laura to me for evaluation  Below are the relevant portions of my assessment and plan of care  Right femoral vein DVT (HCC)  Right common femoral vein DVT incidentally identified on venous reflux study  We discussed this finding along with the associated risks and appropriate treatment  I have given her a starter pack for Xarelto which I will ask that she start today  I have asked her to continue anticoagulation for a total of 3 months at which time a repeat duplex will be performed and further recommendations will be made  I have also asked that she follow up with her primary care physician for evaluation  Of note she denies any inciting event but with her history of breast and endometrial cancer the possibility of hypercoagulability of malignancy has to be considered  If you have questions, please do not hesitate to call me  I look forward to following Jacob along with you           Sincerely,        Zac Norton MD        CC: No Recipients

## 2019-12-19 NOTE — PATIENT INSTRUCTIONS
Right femoral vein DVT (HCC)  Right common femoral vein DVT incidentally identified on venous reflux study  We discussed this finding along with the associated risks and appropriate treatment  I have given her a starter pack for Xarelto which I will ask that she start today  I have asked her to continue anticoagulation for a total of 3 months at which time a repeat duplex will be performed and further recommendations will be made  I have also asked that she follow up with her primary care physician for evaluation

## 2019-12-19 NOTE — PROGRESS NOTES
Assessment/Plan:    Right femoral vein DVT (HCC)  Right common femoral vein DVT incidentally identified on venous reflux study  We discussed this finding along with the associated risks and appropriate treatment  I have given her a starter pack for Xarelto which I will ask that she start today  I have asked her to continue anticoagulation for a total of 3 months at which time a repeat duplex will be performed and further recommendations will be made  I have also asked that she follow up with her primary care physician for evaluation  Of note she denies any inciting event but with her history of breast and endometrial cancer the possibility of hypercoagulability of malignancy has to be considered  Diagnoses and all orders for this visit:    Symptomatic varicose veins of both lower extremities    Acute deep vein thrombosis (DVT) of femoral vein of right lower extremity (HCC)  -     rivaroxaban (XARELTO) 20 mg tablet; Take 1 tablet (20 mg total) by mouth daily with dinner  -     Ambulatory referral to [facility]; Future  -     VAS lower limb venous duplex study, unilateral/limited; Future          Subjective:      Patient ID: Lakshmi Segovia is a 75 y o  female  Pt is here today to review results of LEVDR done 12/3/2019  Pt c/o bulging, painful veins  She says that the right leg is worse that the left  Pt is wearing here compression stocking, elevating her legs and walking  Pt denies any open wounds or bleeding veins  Pt c/o right leg swelling  48-year-old initially evaluated for bilateral, right greater than left, leg pain presents in follow-up for symptomatic varicose veins  She complains of bilateral discomfort mostly over the lateral aspect of her right upper calf which is been present for matter of years but worsened over the past year  Of note she has had multiple knee surgeries and does have some underlying knee discomfort    She does know in the region of discomfort there are prominent superficial veins which she notes do swell at times  She denies any significant general leg swelling or new symptoms within the past few weeks or months  She denies chest pain, cough or shortness of breath  On review of duplex 12/03/2019 there is the incidental finding of a focal acute deep vein thrombosis of the common femoral vein  Valvular function remains normal throughout the deep system with isolated incompetency in the mid calf greater saphenous vein on the right and the short saphenous vein in the ankle  The following portions of the patient's history were reviewed and updated as appropriate: allergies, current medications, past family history, past medical history, past social history, past surgical history and problem list     The ROS as bellow was reviewed and changes made as indictated       Review of Systems   Constitutional: Negative  HENT: Negative  Eyes: Positive for itching  Respiratory: Negative  Cardiovascular: Negative  Gastrointestinal: Negative  Endocrine: Negative  Genitourinary: Negative  Musculoskeletal: Positive for back pain  Skin: Negative  Allergic/Immunologic: Positive for food allergies  Neurological: Negative  Hematological: Negative  Psychiatric/Behavioral: Negative  Objective:      /92 (BP Location: Left arm, Patient Position: Sitting, Cuff Size: Standard)   Pulse 86   Ht 5' 2" (1 575 m)   Wt 91 6 kg (202 lb)   BMI 36 95 kg/m²          Physical Exam   Constitutional: She is oriented to person, place, and time  She appears well-developed and well-nourished  Cardiovascular: Normal rate  Spider and reticular varicosities throughout bilateral lower extremities  There are no obvious truncal varicosities  Pulmonary/Chest: Effort normal and breath sounds normal    Musculoskeletal: Normal range of motion  She exhibits no edema or tenderness  Neurological: She is alert and oriented to person, place, and time     Skin: Skin is warm and dry  Psychiatric: She has a normal mood and affect

## 2019-12-20 ENCOUNTER — DOCUMENTATION (OUTPATIENT)
Dept: ADMINISTRATIVE | Facility: HOSPITAL | Age: 74
End: 2019-12-20

## 2019-12-23 ENCOUNTER — OFFICE VISIT (OUTPATIENT)
Dept: INTERNAL MEDICINE CLINIC | Facility: CLINIC | Age: 74
End: 2019-12-23
Payer: COMMERCIAL

## 2019-12-23 VITALS
TEMPERATURE: 98.3 F | SYSTOLIC BLOOD PRESSURE: 146 MMHG | HEART RATE: 65 BPM | OXYGEN SATURATION: 98 % | HEIGHT: 62 IN | DIASTOLIC BLOOD PRESSURE: 78 MMHG | BODY MASS INDEX: 36.77 KG/M2 | WEIGHT: 199.8 LBS

## 2019-12-23 DIAGNOSIS — D48.7 NEOPLASM OF UNCERTAIN BEHAVIOR OF FACE: ICD-10-CM

## 2019-12-23 DIAGNOSIS — Z90.11 H/O RIGHT MASTECTOMY: ICD-10-CM

## 2019-12-23 DIAGNOSIS — E78.2 MIXED HYPERLIPIDEMIA: ICD-10-CM

## 2019-12-23 DIAGNOSIS — Z85.3 HISTORY OF BREAST CANCER: ICD-10-CM

## 2019-12-23 DIAGNOSIS — I82.411 ACUTE DEEP VEIN THROMBOSIS (DVT) OF FEMORAL VEIN OF RIGHT LOWER EXTREMITY (HCC): Primary | ICD-10-CM

## 2019-12-23 DIAGNOSIS — C50.912 BREAST CANCER, STAGE 1, LEFT (HCC): ICD-10-CM

## 2019-12-23 DIAGNOSIS — C54.9 MALIGNANT NEOPLASM OF BODY OF UTERUS, UNSPECIFIED SITE (HCC): ICD-10-CM

## 2019-12-23 PROCEDURE — 99214 OFFICE O/P EST MOD 30 MIN: CPT | Performed by: INTERNAL MEDICINE

## 2019-12-23 PROCEDURE — 1160F RVW MEDS BY RX/DR IN RCRD: CPT | Performed by: INTERNAL MEDICINE

## 2019-12-23 NOTE — PROGRESS NOTES
Assessment/Plan:    Right femoral vein DVT (HCC)  Incidental finding on LE dopplers assessing venous incompetence  She has no provoking events  She has no previous DVTs/PEs but has metachronous   bilateral breast and uterine cancer s/p mastectomy on right lumpectomy on left and TAHBSO  Start Xarelto with food  Discussed bleeding risk of medication and risks of more DVTs or a PE if she does not take it  She is seeing her oncologist, Dr Aura Arevalo, and can discuss length of treatment or further work up  Problem List Items Addressed This Visit        Cardiovascular and Mediastinum    Right femoral vein DVT ([facility] ) - Primary     Incidental finding on LE dopplers assessing venous incompetence  She has no provoking events  She has no previous DVTs/PEs but has metachronous   bilateral breast and uterine cancer s/p mastectomy on right lumpectomy on left and TAHBSO  Start Xarelto with food  Discussed bleeding risk of medication and risks of more DVTs or a PE if she does not take it  She is seeing her oncologist, Dr Aura Arevalo, and can discuss length of treatment or further work up  Genitourinary    Uterine cancer ([facility] [address] )       Other    History of breast cancer    Breast cancer, stage 1, left ([facility] [address] )    H/O right mastectomy      Other Visit Diagnoses     Neoplasm of uncertain behavior of face        Relevant Orders    Ambulatory referral to Dermatology    Mixed hyperlipidemia        Relevant Orders    Lipid panel    CBC and differential    Comprehensive metabolic panel            Subjective:      Patient ID: Joan Garcia is a 75 y o  female  HPI  She had LE dopplers to assess venous incompetence 3 weeks ago  Incidental acute right femoral DVT found and Dr Juan Whiting prescribed her Xarelto  She has not started taking it because of concern over bleeding risk  She has no h/o DVTs/PE  She had gall bladder surgery in February  She has chronic back pain but is ambulatory   She does have a h/o uterine and breast cancer  Currently denies calf pain or swelling  She also denies chest pain, SOB  Up to date with her mammogram (April)  She had CTs of the chest, abdomen and pelvis last year which did not reveal cancer recurrence/mets  She does have lung nodules and is getting a chest CT soon  The following portions of the patient's history were reviewed and updated as appropriate: allergies, current medications, past medical history, past social history, past surgical history and problem list     Review of Systems   Constitutional: Negative for fatigue, fever and unexpected weight change  HENT: Negative for congestion, sinus pressure and sore throat  Respiratory: Negative for cough, shortness of breath and wheezing  Cardiovascular: Negative for chest pain, palpitations and leg swelling  Gastrointestinal: Negative for abdominal pain, blood in stool, constipation, diarrhea, nausea and vomiting  Genitourinary: Negative for difficulty urinating  Musculoskeletal: Positive for back pain  Skin:        Lesion left temple that bled recently   Neurological: Negative for dizziness and headaches  Objective:      /78   Pulse 65   Temp 98 3 °F (36 8 °C)   Ht 5' 2" (1 575 m)   Wt 90 6 kg (199 lb 12 8 oz)   SpO2 98%   BMI 36 54 kg/m²          Physical Exam   Constitutional: She is oriented to person, place, and time  She appears well-developed and well-nourished  HENT:   Head: Normocephalic and atraumatic  Right Ear: External ear normal    Left Ear: External ear normal    Mouth/Throat: Oropharynx is clear and moist    Eyes: Conjunctivae are normal    Neck: Neck supple  Cardiovascular: Normal rate, regular rhythm and normal heart sounds  No murmur heard  Reticular varicosities in the LE, no LE edema   Pulmonary/Chest: Effort normal and breath sounds normal  No respiratory distress  She has no wheezes  She has no rales  Abdominal: Soft  She exhibits no distension and no mass   There is no tenderness  There is no rebound and no guarding  Neurological: She is alert and oriented to person, place, and time  Skin: Skin is warm and dry  Crusty dark brown lesion on the left cheek/temple that looks like seborrheic keratosis   Psychiatric: She has a normal mood and affect   Her behavior is normal  Judgment and thought content normal

## 2019-12-24 PROBLEM — Z90.11 H/O RIGHT MASTECTOMY: Status: ACTIVE | Noted: 2019-12-24

## 2019-12-24 NOTE — ASSESSMENT & PLAN NOTE
Incidental finding on LE dopplers assessing venous incompetence  She has no provoking events  She has no previous DVTs/PEs but has metachronous   bilateral breast and uterine cancer s/p mastectomy on right lumpectomy on left and TAHBSO  Start Xarelto with food  Discussed bleeding risk of medication and risks of more DVTs or a PE if she does not take it  She is seeing her oncologist, Dr Gabby Cotter, and can discuss length of treatment or further work up

## 2019-12-31 ENCOUNTER — TELEPHONE (OUTPATIENT)
Dept: VASCULAR SURGERY | Facility: CLINIC | Age: 74
End: 2019-12-31

## 2019-12-31 DIAGNOSIS — I82.411 ACUTE DEEP VEIN THROMBOSIS (DVT) OF FEMORAL VEIN OF RIGHT LOWER EXTREMITY (HCC): Primary | ICD-10-CM

## 2019-12-31 NOTE — TELEPHONE ENCOUNTER
Patient seen in office 12/19/19 by Dr Finn Saenz  Venous duplex with R CFV DVT, pt was given Xarelto starter pack and advised to take 3 months of AC  Received call from Dr Mary Dodson at [facility] today  Patient saw him on 12/24; at that time she had not yet started Xarelto  She had repeat LEV on 12/30/19 that was negative for DVT and normal D-dimer  I spoke with patient she is asymptomatic  She states that she starting taking Xarelto on 12/24 after her appt with Dr Mary Dodson  I advised her to continue Xarelto for 90 days  She remains hesitant to complete a 90 day course, but states she will finish the 30 day starter pack and call us to let us know her final decision if she will complete a full 90 course  I let her know that an additional 60 day supply of Xarelto would be prescribed to her pharmacy and discussed the importance of 3 months treatment

## 2020-01-21 NOTE — TELEPHONE ENCOUNTER
Spoke with patient and relayed Bonnie's message  Patient will come to [facility] office (directions given) tomorrow to  what we have of Xarelto 20 mg samples

## 2020-01-21 NOTE — TELEPHONE ENCOUNTER
4 sample bottles of Xarelto 20 mg given to Parminder Back to take with her as she will be in [facility] office tomorrow  Patient was informed she can now  Xarelto at the [address] [place] [address],[address] [facility] office  She verbalized understanding

## 2020-01-21 NOTE — TELEPHONE ENCOUNTER
I am familiar with this patient; in the past I have discussed her case with Dr Bob Daniel ([facility]) and with Dr Vern Ruelas  She should complete a 3 month course of AC, as I discussed on the phone with her back in December  Please give her samples if she needs and I also prescribed Xarelto at the time I spoke with her  It would be preferable is she had repeat imaging done here that way the images can be reviewed  No need to check LEV now, as it would not  and recommendation for continued AC x3 months    Thanks

## 2020-01-21 NOTE — TELEPHONE ENCOUNTER
Patient called today to report she was told by [facility] she does not have a blood clot  She also states she is down to 2 Xarelto pills and is requesting to be given another sample pack of Xarelto  In another breath, patient states she does not want to continue the Xarelto if she does not have a blood clot  Advised patient taking a 90 day course of Xarelto is how she was being treated for her blood clot  Patient is asking if she can have another duplex done at [facility] to see if blood clot still remains  Did attempt to explain to patient the importance of the full 3 months  Also advised patient she would benefit from choosing one network and keeping all her information easier to maintain for patient and provider  Please advise what patient should do regarding coming here for next duplex and if she needs to continue Xarelto

## 2020-02-10 ENCOUNTER — OFFICE VISIT (OUTPATIENT)
Dept: INTERNAL MEDICINE CLINIC | Facility: CLINIC | Age: 75
End: 2020-02-10
Payer: COMMERCIAL

## 2020-02-10 VITALS
BODY MASS INDEX: 37.02 KG/M2 | HEIGHT: 62 IN | WEIGHT: 201.2 LBS | HEART RATE: 63 BPM | SYSTOLIC BLOOD PRESSURE: 122 MMHG | OXYGEN SATURATION: 96 % | DIASTOLIC BLOOD PRESSURE: 70 MMHG

## 2020-02-10 DIAGNOSIS — I82.411 ACUTE DEEP VEIN THROMBOSIS (DVT) OF FEMORAL VEIN OF RIGHT LOWER EXTREMITY (HCC): Primary | ICD-10-CM

## 2020-02-10 DIAGNOSIS — Z23 NEED FOR VACCINATION: ICD-10-CM

## 2020-02-10 DIAGNOSIS — E78.2 MIXED HYPERLIPIDEMIA: ICD-10-CM

## 2020-02-10 DIAGNOSIS — Z00.00 MEDICARE ANNUAL WELLNESS VISIT, SUBSEQUENT: ICD-10-CM

## 2020-02-10 PROCEDURE — 1125F AMNT PAIN NOTED PAIN PRSNT: CPT | Performed by: INTERNAL MEDICINE

## 2020-02-10 PROCEDURE — 90662 IIV NO PRSV INCREASED AG IM: CPT

## 2020-02-10 PROCEDURE — 3288F FALL RISK ASSESSMENT DOCD: CPT | Performed by: INTERNAL MEDICINE

## 2020-02-10 PROCEDURE — 1170F FXNL STATUS ASSESSED: CPT | Performed by: INTERNAL MEDICINE

## 2020-02-10 PROCEDURE — G0008 ADMIN INFLUENZA VIRUS VAC: HCPCS

## 2020-02-10 PROCEDURE — G0439 PPPS, SUBSEQ VISIT: HCPCS | Performed by: INTERNAL MEDICINE

## 2020-02-10 PROCEDURE — 99214 OFFICE O/P EST MOD 30 MIN: CPT | Performed by: INTERNAL MEDICINE

## 2020-02-10 PROCEDURE — 1160F RVW MEDS BY RX/DR IN RCRD: CPT | Performed by: INTERNAL MEDICINE

## 2020-02-10 PROCEDURE — 1101F PT FALLS ASSESS-DOCD LE1/YR: CPT | Performed by: INTERNAL MEDICINE

## 2020-02-10 PROCEDURE — 1036F TOBACCO NON-USER: CPT | Performed by: INTERNAL MEDICINE

## 2020-02-10 PROCEDURE — 4040F PNEUMOC VAC/ADMIN/RCVD: CPT | Performed by: INTERNAL MEDICINE

## 2020-02-10 PROCEDURE — 3008F BODY MASS INDEX DOCD: CPT | Performed by: INTERNAL MEDICINE

## 2020-02-10 NOTE — PROGRESS NOTES
Assessment/Plan:    Right femoral vein DVT (HCC)  3months of Xarelto  Repeat dopplers in both [facility] and [facility]    Mixed hyperlipidemia  BMI Counseling: Body mass index is 36 8 kg/m²  The BMI is above normal  Nutrition recommendations include decreasing overall calorie intake and reducing intake of cholesterol  Problem List Items Addressed This Visit        Cardiovascular and Mediastinum    Right femoral vein DVT (HCC) - Primary     3months of Xarelto  Repeat dopplers in both [facility] and [facility]            Other    Mixed hyperlipidemia     BMI Counseling: Body mass index is 36 8 kg/m²  The BMI is above normal  Nutrition recommendations include decreasing overall calorie intake and reducing intake of cholesterol  Relevant Orders    Lipid panel      Other Visit Diagnoses     Need for vaccination        Relevant Orders    influenza vaccine, [id number], high-dose, PF 0 5 mL (FLUZONE HIGH-DOSE) (Completed)    Medicare annual wellness visit, subsequent                Subjective:      Patient ID: Charli Taylor is a 75 y o  female  HPI  Here for a follolw up  She was diagnosed with a right femoral DVT in December  This was performed due to varicose veins on the right with pain and swelling   She did not start the Xarelto immediately  She saw her oncologist and dopplers repeated  This did not reveal a DVT  She was advised to take the Xarelto x 3months  She is getting another doppler US in both [facility] and [facility]  Reports that she has changed her diet and has lost 5lbs in a month  Cut out fast food, eating salads  Lipids in Dec     The following portions of the patient's history were reviewed and updated as appropriate: allergies, current medications, past family history, past social history, past surgical history and problem list     Review of Systems   Constitutional: Negative for chills, fatigue, fever and unexpected weight change     HENT: Negative for congestion, sinus pressure, sinus pain and sore throat  Respiratory: Negative for cough, shortness of breath and wheezing  Cardiovascular: Negative for chest pain, palpitations and leg swelling  Gastrointestinal: Positive for blood in stool (scant blood on toilet paper)  Negative for abdominal pain, constipation, diarrhea, nausea and vomiting  Genitourinary: Negative for difficulty urinating, hematuria and vaginal bleeding  Musculoskeletal: Positive for myalgias  Neurological: Negative for dizziness and headaches  Objective:      /70   Pulse 63   Ht 5' 2" (1 575 m)   Wt 91 3 kg (201 lb 3 2 oz)   SpO2 96%   BMI 36 80 kg/m²          Physical Exam   Constitutional: She is oriented to person, place, and time  She appears well-developed and well-nourished  HENT:   Head: Normocephalic and atraumatic  Right Ear: External ear normal    Left Ear: External ear normal    Mouth/Throat: Oropharynx is clear and moist    Eyes: Conjunctivae are normal    Neck: Neck supple  Cardiovascular: Normal rate, regular rhythm and normal heart sounds  No murmur heard  Pulmonary/Chest: Effort normal and breath sounds normal  No respiratory distress  She has no wheezes  She has no rales  Abdominal: Soft  She exhibits no distension and no mass  There is no tenderness  There is no rebound and no guarding  Musculoskeletal: She exhibits no edema  Neurological: She is alert and oriented to person, place, and time  Skin: Skin is warm and dry  Psychiatric: She has a normal mood and affect   Her behavior is normal  Judgment and thought content normal

## 2020-02-10 NOTE — PROGRESS NOTES
Assessment and Plan:     Problem List Items Addressed This Visit        Cardiovascular and Mediastinum    Right femoral vein DVT (HCC) - Primary     3months of Xarelto  Repeat dopplers in both [facility]            Other    Mixed hyperlipidemia     BMI Counseling: Body mass index is 36 8 kg/m²  The BMI is above normal  Nutrition recommendations include decreasing overall calorie intake and reducing intake of cholesterol  Relevant Orders    Lipid panel      Other Visit Diagnoses     Need for vaccination        Relevant Orders    influenza vaccine, [id number], high-dose, PF 0 5 mL (FLUZONE HIGH-DOSE) (Completed)    Medicare annual wellness visit, subsequent            BMI Counseling: Body mass index is 36 8 kg/m²  The BMI is above normal  Nutrition recommendations include decreasing fast food intake, consuming healthier snacks and reducing intake of cholesterol  Preventive health issues were discussed with patient, and age appropriate screening tests were ordered as noted in patient's After Visit Summary  Personalized health advice and appropriate referrals for health education or preventive services given if needed, as noted in patient's After Visit Summary       History of Present Illness:     Patient presents for Medicare Annual Wellness visit    Patient Care Team:  Tab León MD as PCP - [address], DO Brenda Pritchard, [place] (Vascular Surgery)  Robyn Martinez MD (Vascular Surgery)     Problem List:     Patient Active Problem List   Diagnosis    Breast cancer, stage 1, left ([address] )    Uterine cancer ([address] )    History of breast cancer    Pulmonary nodule    Lumbar spondylosis    Spinal stenosis, lumbar region, with neurogenic claudication    Symptomatic varicose veins of both lower extremities    Right femoral vein DVT ([address] )    H/O right mastectomy    Mixed hyperlipidemia      Past Medical and Surgical History:     Past Medical History:   Diagnosis Date    Breast cancer ([facility] [address] )     B/L    Endometrial cancer ([facility] [address] )     PONV (postoperative nausea and vomiting)      Past Surgical History:   Procedure Laterality Date    BREAST LUMPECTOMY Left     BREAST RECONSTRUCTION Right 2010    COLONOSCOPY      JOINT REPLACEMENT Bilateral     Knees    KNEE ARTHROSCOPY Bilateral     MASTECTOMY Right     2003    ORTHOPEDIC SURGERY Bilateral     knee arthoscopy    AK LAP,CHOLECYSTECTOMY N/A 2/15/2019    Procedure: CHOLECYSTECTOMY LAPAROSCOPIC;  Surgeon: Abelardo Bills MD;  [facility];  Service: General    RADICAL ABDOMINAL HYSTERECTOMY      REDUCTION MAMMAPLASTY Left       Family History:     Family History   Problem Relation Age of Onset   [facility] Stroke Mother     Hypertension Mother     Cancer Father       Social History:     Social History     Socioeconomic History    Marital status:       Spouse name: None    Number of children: None    Years of education: None    Highest education level: None   Occupational History    Occupation: retired   Social Needs    Financial resource strain: None    Food insecurity:     Worry: None     Inability: None    Transportation needs:     Medical: None     Non-medical: None   Tobacco Use    Smoking status: Never Smoker    Smokeless tobacco: Never Used   Substance and Sexual Activity    Alcohol use: No    Drug use: No    Sexual activity: Never   Lifestyle    Physical activity:     Days per week: None     Minutes per session: None    Stress: None   Relationships    Social connections:     Talks on phone: None     Gets together: None     Attends Jain service: None     Active member of club or organization: None     Attends meetings of clubs or organizations: None     Relationship status: None    Intimate partner violence:     Fear of current or ex partner: None     Emotionally abused: None     Physically abused: None     Forced sexual activity: None   Other Topics Concern    None   Social History Narrative    Lives with adult daughter       Medications and Allergies:     Current Outpatient Medications   Medication Sig Dispense Refill    ALPRAZolam (XANAX) 0 25 mg tablet Take 1 tablet (0 25 mg total) by mouth daily as needed for anxiety 30 tablet 0    Calcium Carbonate-Vitamin D (CALTRATE 600+D) 600-400 MG-UNIT per tablet Take 1 tablet by mouth 2 (two) times a day       gabapentin (NEURONTIN) 300 mg capsule Take 1 capsule (300 mg total) by mouth 2 (two) times a day 180 capsule 1    rivaroxaban (XARELTO) 20 mg tablet Take 1 tablet (20 mg total) by mouth daily with dinner 30 tablet 2    rivaroxaban (XARELTO) 20 mg tablet Take 1 tablet (20 mg total) by mouth daily Start after completion of Xarelto starter pack (Patient not taking: Reported on 2/10/2020) 60 tablet 0     No current facility-administered medications for this visit  Allergies   Allergen Reactions    Latex Itching and Rash    Penicillin V Rash    Other Other (See Comments)     rash    Aspirin GI Intolerance    Oxycodone-Acetaminophen GI Intolerance and Headache      Immunizations:     Immunization History   Administered Date(s) Administered    Influenza Split High Dose Preservative Free IM 11/01/2014    Influenza, high dose seasonal 0 5 mL 02/10/2020    Pneumococcal Conjugate 13-Valent 07/15/2019    Pneumococcal Polysaccharide PPV23 11/01/2014      Health Maintenance:         Topic Date Due    CRC Screening: Colonoscopy  02/07/2023         Topic Date Due    Influenza Vaccine  07/01/2019      Medicare Health Risk Assessment:     /70   Pulse 63   Ht 5' 2" (1 575 m)   Wt 91 3 kg (201 lb 3 2 oz)   SpO2 96%   BMI 36 80 kg/m²      Jacob is here for her Subsequent Wellness visit  Health Risk Assessment:   Patient rates overall health as good  Patient feels that their physical health rating is slightly better  Eyesight was rated as same  Hearing was rated as same   Patient feels that their emotional and mental health rating is slightly better  Pain experienced in the last 7 days has been none  Patient states that she has experienced no weight loss or gain in last 6 months  Depression Screening:   PHQ-2 Score: 0      Fall Risk Screening: In the past year, patient has experienced: no history of falling in past year      Urinary Incontinence Screening:   Patient has not leaked urine accidently in the last six months  Home Safety:  Patient does not have trouble with stairs inside or outside of their home  Patient has working smoke alarms and has working carbon monoxide detector  Home safety hazards include: none  Nutrition:   Current diet is Low Cholesterol, Low Carb, No Added Salt and Limited junk food  Medications:   Patient is currently taking over-the-counter supplements  OTC medications include: see medication list  Patient is able to manage medications  Activities of Daily Living (ADLs)/Instrumental Activities of Daily Living (IADLs):   Walk and transfer into and out of bed and chair?: Yes  Dress and groom yourself?: Yes    Bathe or shower yourself?: Yes    Feed yourself?  Yes  Do your laundry/housekeeping?: Yes  Manage your money, pay your bills and track your expenses?: Yes  Make your own meals?: Yes    Do your own shopping?: No    Durable Medical Equipment Suppliers  none    Previous Hospitalizations:   Any hospitalizations or ED visits within the last 12 months?: No    How many hospitalizations have you had in the last year?: 1-2    Cognitive Screening:   Provider or family/friend/caregiver concerned regarding cognition?: No    PREVENTIVE SCREENINGS      Cardiovascular Screening:    General: Screening Not Indicated and History Lipid Disorder      Diabetes Screening:     General: Screening Current      Colorectal Cancer Screening:     General: Screening Current      Breast Cancer Screening:     General: History Breast Cancer      Cervical Cancer Screening:    General: Screening Not Indicated      Osteoporosis Screening: General: Screening Current      Abdominal Aortic Aneurysm (AAA) Screening:        General: Screening Not Indicated      Lung Cancer Screening:     General: Screening Not Indicated      Hepatitis C Screening:    General: Screening Not Indicated      Sb Azar MD

## 2020-02-10 NOTE — ASSESSMENT & PLAN NOTE
BMI Counseling: Body mass index is 36 8 kg/m²  The BMI is above normal  Nutrition recommendations include decreasing overall calorie intake and reducing intake of cholesterol

## 2020-02-18 DIAGNOSIS — I82.411 ACUTE DEEP VEIN THROMBOSIS (DVT) OF FEMORAL VEIN OF RIGHT LOWER EXTREMITY (HCC): ICD-10-CM

## 2020-02-18 NOTE — TELEPHONE ENCOUNTER
Per Dr Henri Tang note 12/19/19 pt to remain on xarelto x 3 mos, then have f/u doppler and ov  These are scheduled for next month but she is running out of xarelto and needs refill

## 2020-03-03 ENCOUNTER — HOSPITAL ENCOUNTER (OUTPATIENT)
Dept: NON INVASIVE DIAGNOSTICS | Facility: CLINIC | Age: 75
Discharge: HOME | End: 2020-03-03
Payer: COMMERCIAL

## 2020-03-03 DIAGNOSIS — I82.411 ACUTE DEEP VEIN THROMBOSIS (DVT) OF FEMORAL VEIN OF RIGHT LOWER EXTREMITY (HCC): ICD-10-CM

## 2020-03-03 PROCEDURE — 93971 EXTREMITY STUDY: CPT | Performed by: SURGERY

## 2020-03-03 PROCEDURE — 93971 EXTREMITY STUDY: CPT

## 2020-03-09 ENCOUNTER — TELEPHONE (OUTPATIENT)
Dept: PAIN MEDICINE | Facility: MEDICAL CENTER | Age: 75
End: 2020-03-09

## 2020-03-10 NOTE — TELEPHONE ENCOUNTER
S/W pt  Advised pt she needs to call Dr Link Myers office for the results since he ordered it  Pt stated she realized she dialed the wrong #  Pt appreciative

## 2020-03-30 ENCOUNTER — TELEMEDICINE (OUTPATIENT)
Dept: VASCULAR SURGERY | Facility: CLINIC | Age: 75
End: 2020-03-30
Payer: COMMERCIAL

## 2020-03-30 DIAGNOSIS — I82.511 CHRONIC DEEP VEIN THROMBOSIS (DVT) OF FEMORAL VEIN OF RIGHT LOWER EXTREMITY (HCC): Primary | ICD-10-CM

## 2020-03-30 DIAGNOSIS — M47.816 LUMBAR SPONDYLOSIS: ICD-10-CM

## 2020-03-30 DIAGNOSIS — F41.9 ANXIETY: ICD-10-CM

## 2020-03-30 PROBLEM — I82.411 RIGHT FEMORAL VEIN DVT (HCC): Status: RESOLVED | Noted: 2019-12-19 | Resolved: 2020-03-30

## 2020-03-30 PROCEDURE — G2012 BRIEF CHECK IN BY MD/QHP: HCPCS | Performed by: SURGERY

## 2020-03-30 NOTE — PROGRESS NOTES
Virtual Brief Visit    Problem List Items Addressed This Visit        Cardiovascular and Mediastinum    RESOLVED: Right femoral vein DVT (HCC) - Primary     Right common femoral vein DVT incidentally identified on duplex for evaluation of symptomatic varicose veins  We discussed the findings on recent duplex which show resolution of DVT  She in fact has completed a full 3 month course of anticoagulation and remains minimally symptomatic  We will stop anticoagulation and at this time follow up on an as-needed basis  We did discuss the role of compressive therapy in the setting  She will contact us if there are any recurrent symptoms  Reason for visit is follow-up of right femoral vein DVT    Encounter provider Alannah Bartlett MD    Provider located at [address]      Recent Visits  No visits were found meeting these conditions  Showing recent visits within past 7 days and meeting all other requirements     Today's Visits  Date Type Provider Dept   03/30/20 Telemedicine Alannah Bartlett MD [facility] [facility] [facility] today's visits and meeting all other requirements     Future Appointments  No visits were found meeting these conditions  Showing future appointments within next 150 days and meeting all other requirements        After connecting through telephone, the patient was identified by name and date of birth  Noman Negrete was informed that this is a telemedicine visit and that the visit is being conducted through telephone  My office door was closed  No one else was in the room  She acknowledged consent and understanding of privacy and security of the video platform  The patient has agreed to participate and understands they can discontinue the visit at any time  Patient is aware this is a billable service  Joao Andrade is a 67 y o  female     Patient was initially evaluated approximately 3 months ago in regards to a symptomatic varicose vein of the right lower extremity  She underwent duplex evaluation to evaluate for reflux disease and was incidentally noted to have a isolated common femoral vein DVT  On evaluation today she denies any discomfort in the lower extremity  She denies any swelling  She does note varicosity within the right leg without significant change  This is not causing severe symptoms at this time         Past Medical History:   Diagnosis Date    [facility] cancer ([facility] )     B/L    Endometrial cancer ([facility] )     PONV (postoperative nausea and vomiting)        Past Surgical History:   Procedure Laterality Date    BREAST LUMPECTOMY Left     BREAST RECONSTRUCTION Right 2010    COLONOSCOPY      JOINT REPLACEMENT Bilateral     Knees    KNEE ARTHROSCOPY Bilateral     MASTECTOMY Right     2003    ORTHOPEDIC SURGERY Bilateral     knee arthoscopy    OR LAP,CHOLECYSTECTOMY N/A 2/15/2019    Procedure: CHOLECYSTECTOMY LAPAROSCOPIC;  Surgeon: Eric Koyanagi, MD;  [facility];  Service: General    RADICAL ABDOMINAL HYSTERECTOMY      REDUCTION MAMMAPLASTY Left        Current Outpatient Medications   Medication Sig Dispense Refill    ALPRAZolam (XANAX) 0 25 mg tablet Take 1 tablet (0 25 mg total) by mouth daily as needed for anxiety 30 tablet 0    Calcium Carbonate-Vitamin D (CALTRATE 600+D) 600-400 MG-UNIT per tablet Take 1 tablet by mouth 2 (two) times a day       gabapentin (NEURONTIN) 300 mg capsule Take 1 capsule (300 mg total) by mouth 2 (two) times a day 180 capsule 1     No current facility-administered medications for this visit  Allergies   Allergen Reactions    Latex Itching and Rash    Penicillin V Rash    Other Other (See Comments)     rash    Aspirin GI Intolerance    Oxycodone-Acetaminophen GI Intolerance and Headache       Review of Systems   Constitutional: Negative  Respiratory: Negative  Cardiovascular: Negative  Musculoskeletal: Negative for arthralgias, gait problem and joint swelling  Skin: Negative  Neurological: Negative  Hematological: Negative  I spent 15 minutes with the patient during this visit  Vascular Surgery Virtual Telemedicine Visit -   Roro Colin 67 y o  female MRN: [id number]    @ Encounter: [id number]    Due to the [address] Emergency, this visit was done via telephone  Assessment/Plan:    Patient Active Problem List    Diagnosis Date Noted    Mixed hyperlipidemia 02/10/2020    H/O right mastectomy 12/24/2019    Symptomatic varicose veins of both lower extremities 08/13/2019    Spinal stenosis, lumbar region, with neurogenic claudication 07/24/2019    Lumbar spondylosis 07/15/2019    Pulmonary nodule 01/02/2019    History of breast cancer 06/20/2018    [facility] 02/27/2017    Uter[facility] cancer [facility] 11/04/2012       {Visit Dx and Orders (Refreshable);LNK, Assessment/Plan         -------------------------------------------------------------------------------------------------------------------    Reason for visit is follow-up of right femoral vein DVT    This e-visit was done via telephone  Encounter provider Ness Perea MD    Provider located at [address]      Recent Visits  No visits were found meeting these conditions  Showing recent visits within past 7 days and meeting all other requirements     Today's Visits  Date Type Provider Dept   03/30/20 Telemedicine Ness Perea MD [facility] [facility] today's visits and meeting all other requirements     Future Appointments  No visits were found meeting these conditions  Showing future appointments within next 150 days and meeting all other requirements        After connecting through [facility], the patient was identified by name and date of birth   Roro Colin was informed that this is a telemedicine visit and that the exam is being conducted confidentially over secure lines  My office door was closed  No one else was in the room  She acknowledged consent and understanding of privacy and security of the video platform  The patient has agreed to participate and understands they can discontinue the visit at any time  HPI:         Past Medical History:   Diagnosis Date    Breast cancer ([facility] )     B/L    [facility]     PONV (postoperative nausea and vomiting)        Review of Systems - Negative except as above    Allergies   Allergen Reactions    Latex Itching and Rash    Penicillin V Rash    Other Other (See Comments)     rash    Aspirin GI Intolerance    Oxycodone-Acetaminophen GI Intolerance and Headache       Current Outpatient Medications:     ALPRAZolam (XANAX) 0 25 mg tablet, Take 1 tablet (0 25 mg total) by mouth daily as needed for anxiety, Disp: 30 tablet, Rfl: 0    Calcium Carbonate-Vitamin D (CALTRATE 600+D) 600-400 MG-UNIT per tablet, Take 1 tablet by mouth 2 (two) times a day , Disp: , Rfl:     gabapentin (NEURONTIN) 300 mg capsule, Take 1 capsule (300 mg total) by mouth 2 (two) times a day, Disp: 180 capsule, Rfl: 1    Social History     Socioeconomic History    Marital status:       Spouse name: Not on file    Number of children: Not on file    Years of education: Not on file    Highest education level: Not on file   Occupational History    Occupation: retired   Social Needs    Financial resource strain: Not on file    Food insecurity:     Worry: Not on file     Inability: Not on file   [employer] needs:     Medical: Not on file     Non-medical: Not on file   Tobacco Use    Smoking status: Never Smoker    Smokeless tobacco: Never Used   Substance and Sexual Activity    Alcohol use: No    Drug use: No    Sexual activity: Never   Lifestyle    Physical activity:     Days per week: Not on file     Minutes per session: Not on file    Stress: Not on file   Relationships    Social connections:     Talks on phone: Not on file     Gets together: Not on file     Attends Orthodox service: Not on file     Active member of club or organization: Not on file     Attends meetings of clubs or organizations: Not on file     Relationship status: Not on file    Intimate partner violence:     Fear of current or ex partner: Not on file     Emotionally abused: Not on file     Physically abused: Not on file     Forced sexual activity: Not on file   Other Topics Concern    Not on file   Social History Narrative    Lives with adult daughter       Family History   Problem Relation Age of Onset    Stroke Mother     Hypertension Mother     Cancer Father        Physical Exam:    Vitals: There were no vitals taken for this visit  , There is no height or weight on file to calculate BMI ,   Wt Readings from Last 3 Encounters:   02/10/20 91 3 kg (201 lb 3 2 oz)   12/23/19 90 6 kg (199 lb 12 8 oz)   12/19/19 91 6 kg (202 lb)       No exam was done as this was a telemedicine (telephone) visit  Labs & Results:      Imaging review:  Venous duplex 03/03/2020 with resolution of right common femoral vein DVT  Duplex 12/03/2019 with isolated acute right common femoral vein DVT  There is isolated incompetency of the right greater saphenous vein in the calf  On the left there is isolated incompetency of the short saphenous vein at the ankle with normal valvular function in the greater saphenous and deep venous system  Counseling / Coordination of Care  Total time spent today 10minutes  Greater than 50% of total time was spent with the patient and / or family counseling and / or coordination of care  A description of the counseling / coordination of care: 15 min  Thank you for the opportunity to participate in the care of this patient

## 2020-03-30 NOTE — LETTER
March 30, 2020     Betty Phillip MD  [address]  [address] [place] [address]    Patient: Silvana Orellana   YOB: 1945   Date of Visit: 3/30/2020       Dear Dr Sarah Joe: Thank you for referring Silvana Orellana to me for evaluation  Below are the relevant portions of my assessment and plan of care  Problem List Items Addressed This Visit        Cardiovascular and Mediastinum    RESOLVED: Right femoral vein DVT (HCC) - Primary     Right common femoral vein DVT incidentally identified on duplex for evaluation of symptomatic varicose veins  We discussed the findings on recent duplex which show resolution of DVT  She in fact has completed a full 3 month course of anticoagulation and remains minimally symptomatic  We will stop anticoagulation and at this time follow up on an as-needed basis  We did discuss the role of compressive therapy in the setting  She will contact us if there are any recurrent symptoms  If you have questions, please do not hesitate to call me  I look forward to following Jacob along with you           Sincerely,        Lennox Rankin, MD        CC: No Recipients

## 2020-03-30 NOTE — PATIENT INSTRUCTIONS
Problem List Items Addressed This Visit        Cardiovascular and Mediastinum    RESOLVED: Right femoral vein DVT (HCC) - Primary     Right common femoral vein DVT incidentally identified on duplex for evaluation of symptomatic varicose veins  We discussed the findings on recent duplex which show resolution of DVT  She in fact has completed a full 3 month course of anticoagulation and remains minimally symptomatic  We will stop anticoagulation and at this time follow up on an as-needed basis  We did discuss the role of compressive therapy in the setting  She will contact us if there are any recurrent symptoms

## 2020-03-31 RX ORDER — GABAPENTIN 300 MG/1
300 CAPSULE ORAL 2 TIMES DAILY
Qty: 180 CAPSULE | Refills: 1 | Status: SHIPPED | OUTPATIENT
Start: 2020-03-31 | End: 2020-04-10 | Stop reason: SDUPTHER

## 2020-03-31 RX ORDER — ALPRAZOLAM 0.25 MG/1
0.25 TABLET ORAL DAILY PRN
Qty: 30 TABLET | Refills: 0 | Status: SHIPPED | OUTPATIENT
Start: 2020-03-31 | End: 2020-04-10 | Stop reason: SDUPTHER

## 2020-04-10 DIAGNOSIS — M47.816 LUMBAR SPONDYLOSIS: ICD-10-CM

## 2020-04-10 DIAGNOSIS — F41.9 ANXIETY: ICD-10-CM

## 2020-04-13 RX ORDER — ALPRAZOLAM 0.25 MG/1
0.25 TABLET ORAL DAILY PRN
Qty: 30 TABLET | Refills: 0 | Status: SHIPPED | OUTPATIENT
Start: 2020-04-13 | End: 2020-04-21 | Stop reason: SDUPTHER

## 2020-04-13 RX ORDER — GABAPENTIN 300 MG/1
300 CAPSULE ORAL 2 TIMES DAILY
Qty: 180 CAPSULE | Refills: 0 | Status: SHIPPED | OUTPATIENT
Start: 2020-04-13 | End: 2021-08-23 | Stop reason: SDUPTHER

## 2020-04-21 DIAGNOSIS — F41.9 ANXIETY: ICD-10-CM

## 2020-04-21 RX ORDER — ALPRAZOLAM 0.25 MG/1
0.25 TABLET ORAL DAILY PRN
Qty: 30 TABLET | Refills: 0 | Status: SHIPPED | OUTPATIENT
Start: 2020-04-21 | End: 2021-02-12 | Stop reason: SDUPTHER

## 2020-04-27 ENCOUNTER — TELEMEDICINE (OUTPATIENT)
Dept: INTERNAL MEDICINE CLINIC | Facility: CLINIC | Age: 75
End: 2020-04-27
Payer: COMMERCIAL

## 2020-04-27 VITALS — HEIGHT: 62 IN | TEMPERATURE: 97 F | BODY MASS INDEX: 36.25 KG/M2 | WEIGHT: 197 LBS

## 2020-04-27 DIAGNOSIS — F41.9 ANXIETY: ICD-10-CM

## 2020-04-27 DIAGNOSIS — E78.2 MIXED HYPERLIPIDEMIA: Primary | ICD-10-CM

## 2020-04-27 PROCEDURE — 99213 OFFICE O/P EST LOW 20 MIN: CPT | Performed by: INTERNAL MEDICINE

## 2020-04-27 PROCEDURE — 1160F RVW MEDS BY RX/DR IN RCRD: CPT | Performed by: INTERNAL MEDICINE

## 2020-08-04 ENCOUNTER — TELEMEDICINE (OUTPATIENT)
Dept: INTERNAL MEDICINE CLINIC | Facility: CLINIC | Age: 75
End: 2020-08-04
Payer: COMMERCIAL

## 2020-08-04 VITALS — BODY MASS INDEX: 35.67 KG/M2 | WEIGHT: 195 LBS | TEMPERATURE: 98.4 F

## 2020-08-04 DIAGNOSIS — F41.9 ANXIETY: ICD-10-CM

## 2020-08-04 DIAGNOSIS — E78.2 MIXED HYPERLIPIDEMIA: Primary | ICD-10-CM

## 2020-08-04 PROCEDURE — 4040F PNEUMOC VAC/ADMIN/RCVD: CPT | Performed by: INTERNAL MEDICINE

## 2020-08-04 PROCEDURE — 99214 OFFICE O/P EST MOD 30 MIN: CPT | Performed by: INTERNAL MEDICINE

## 2020-08-04 PROCEDURE — 1036F TOBACCO NON-USER: CPT | Performed by: INTERNAL MEDICINE

## 2020-08-04 PROCEDURE — 1160F RVW MEDS BY RX/DR IN RCRD: CPT | Performed by: INTERNAL MEDICINE

## 2020-08-04 NOTE — PROGRESS NOTES
Virtual Regular Visit      Assessment/Plan:  Obtain lipid panel  Limited use of Xanax  Problem List Items Addressed This Visit        Other    Mixed hyperlipidemia - Primary    Anxiety               Reason for visit is   Chief Complaint   Patient presents with    Virtual Regular Visit        Encounter provider Nakita Moore MD    Provider located at [address] [place] [id number]      Recent Visits  No visits were found meeting these conditions  Showing recent visits within past 7 days and meeting all other requirements     Today's Visits  Date Type Provider Dept   08/04/20 Telemedicine Nakita Moore MD [address] [facility] today's visits and meeting all other requirements     Future Appointments  No visits were found meeting these conditions  Showing future appointments within next 150 days and meeting all other requirements        The patient was identified by name and date of birth  Alice Saunders was informed that this is a telemedicine visit and that the visit is being conducted through [employer] and patient was informed that this is not a secure, HIPAA-complaint platform  She agrees to proceed     My office door was closed  No one else was in the room  She acknowledged consent and understanding of privacy and security of the video platform  The patient has agreed to participate and understands they can discontinue the visit at any time  Patient is aware this is a billable service  Subjective  Jacob Robin is a 67 y o  female with hyperlipidemia, anxiety         HPI   Doing well overall  Mostly staying home but taking some walks  Continues to work on her diet to lose some weight and improve her cholesterol  She was unable to go to the labs to get her lipid panel checked    Past Medical History:   Diagnosis Date    [facility] )     B/L    Endometrial cancer ([facility]     PONV (postoperative nausea and vomiting) Past Surgical History:   Procedure Laterality Date    BREAST LUMPECTOMY Left     BREAST RECONSTRUCTION Right 2010    COLONOSCOPY      JOINT REPLACEMENT Bilateral     Knees    KNEE ARTHROSCOPY Bilateral     MASTECTOMY Right     2003    ORTHOPEDIC SURGERY Bilateral     knee arthoscopy    SD LAP,CHOLECYSTECTOMY N/A 2/15/2019    Procedure: CHOLECYSTECTOMY LAPAROSCOPIC;  Surgeon: Nahomy Min MD;  [facility];  Service: General    RADICAL ABDOMINAL HYSTERECTOMY      REDUCTION MAMMAPLASTY Left        Current Outpatient Medications   Medication Sig Dispense Refill    ALPRAZolam (XANAX) 0 25 mg tablet Take 1 tablet (0 25 mg total) by mouth daily as needed for anxiety 30 tablet 0    Calcium Carbonate-Vitamin D (CALTRATE 600+D) 600-400 MG-UNIT per tablet Take 1 tablet by mouth 2 (two) times a day       gabapentin (NEURONTIN) 300 mg capsule Take 1 capsule (300 mg total) by mouth 2 (two) times a day 180 capsule 0     No current facility-administered medications for this visit  Allergies   Allergen Reactions    Latex Itching and Rash    Penicillin V Rash    Other Other (See Comments)     rash    Aspirin GI Intolerance    Oxycodone-Acetaminophen GI Intolerance and Headache       Review of Systems   Constitutional: Negative for chills, fever and unexpected weight change  HENT: Negative for congestion  Respiratory: Negative for cough and shortness of breath  Cardiovascular: Negative for chest pain and palpitations  Gastrointestinal: Negative for constipation and diarrhea  Genitourinary: Negative for difficulty urinating and dysuria  Musculoskeletal: Positive for arthralgias  Negative for myalgias  Neurological: Negative for dizziness and headaches  Psychiatric/Behavioral: The patient is nervous/anxious (takes Xanax 1-2 a week)          Video Exam    Vitals:    08/04/20 1320   Temp: 98 4 °F (36 9 °C)   Weight: 88 5 kg (195 lb)       Physical Exam   Constitutional:  Non-toxic appearance  She does not appear ill  No distress  Pulmonary/Chest: No respiratory distress  Skin: She is not diaphoretic  Psychiatric: Her behavior is normal  Mood, judgment and thought content normal         I spent 15 minutes directly with the patient during this visit      [address] acknowledges that she has consented to an online visit or consultation  She understands that the online visit is based solely on information provided by her, and that, in the absence of a face-to-face physical evaluation by the physician, the diagnosis she receives is both limited and provisional in terms of accuracy and completeness  This is not intended to replace a full medical face-to-face evaluation by the physician  Elsy Mueller understands and accepts these terms

## 2021-02-08 ENCOUNTER — TELEPHONE (OUTPATIENT)
Dept: OTHER | Facility: OTHER | Age: 76
End: 2021-02-08

## 2021-02-08 NOTE — TELEPHONE ENCOUNTER
Date Time Pillo Providers Departments Visit Type                      C [x] 2/12/21 2:30 PM 30 Jose Carlos Velarde MD [[id number]] [facility] [[id number]] [facility] [[id number]]                                   C - Canceled

## 2021-02-12 ENCOUNTER — OFFICE VISIT (OUTPATIENT)
Dept: INTERNAL MEDICINE CLINIC | Facility: CLINIC | Age: 76
End: 2021-02-12
Payer: COMMERCIAL

## 2021-02-12 VITALS
HEART RATE: 71 BPM | BODY MASS INDEX: 36.25 KG/M2 | SYSTOLIC BLOOD PRESSURE: 146 MMHG | OXYGEN SATURATION: 98 % | TEMPERATURE: 97.8 F | DIASTOLIC BLOOD PRESSURE: 72 MMHG | WEIGHT: 197 LBS | HEIGHT: 62 IN

## 2021-02-12 DIAGNOSIS — F41.9 ANXIETY: ICD-10-CM

## 2021-02-12 DIAGNOSIS — M47.816 LUMBAR SPONDYLOSIS: ICD-10-CM

## 2021-02-12 DIAGNOSIS — Z00.00 MEDICARE ANNUAL WELLNESS VISIT, SUBSEQUENT: Primary | ICD-10-CM

## 2021-02-12 DIAGNOSIS — Z23 ENCOUNTER FOR IMMUNIZATION: ICD-10-CM

## 2021-02-12 DIAGNOSIS — E78.2 MIXED HYPERLIPIDEMIA: ICD-10-CM

## 2021-02-12 PROCEDURE — G0439 PPPS, SUBSEQ VISIT: HCPCS | Performed by: INTERNAL MEDICINE

## 2021-02-12 PROCEDURE — 1170F FXNL STATUS ASSESSED: CPT | Performed by: INTERNAL MEDICINE

## 2021-02-12 PROCEDURE — 3288F FALL RISK ASSESSMENT DOCD: CPT | Performed by: INTERNAL MEDICINE

## 2021-02-12 PROCEDURE — 3725F SCREEN DEPRESSION PERFORMED: CPT | Performed by: INTERNAL MEDICINE

## 2021-02-12 PROCEDURE — 1160F RVW MEDS BY RX/DR IN RCRD: CPT | Performed by: INTERNAL MEDICINE

## 2021-02-12 PROCEDURE — 1036F TOBACCO NON-USER: CPT | Performed by: INTERNAL MEDICINE

## 2021-02-12 PROCEDURE — 1125F AMNT PAIN NOTED PAIN PRSNT: CPT | Performed by: INTERNAL MEDICINE

## 2021-02-12 RX ORDER — ALPRAZOLAM 0.25 MG/1
0.25 TABLET ORAL DAILY PRN
Qty: 30 TABLET | Refills: 0 | Status: SHIPPED | OUTPATIENT
Start: 2021-02-12 | End: 2021-08-23 | Stop reason: SDUPTHER

## 2021-02-12 NOTE — PATIENT INSTRUCTIONS
Medicare Preventive Visit Patient Instructions  Thank you for completing your Welcome to Medicare Visit or Medicare Annual Wellness Visit today  Your next wellness visit will be due in one year (2/12/2022)  The screening/preventive services that you may require over the next 5-10 years are detailed below  Some tests may not apply to you based off risk factors and/or age  Screening tests ordered at today's visit but not completed yet may show as past due  Also, please note that scanned in results may not display below  Preventive Screenings:  Service Recommendations Previous Testing/Comments   Colorectal Cancer Screening  * Colonoscopy    * Fecal Occult Blood Test (FOBT)/Fecal Immunochemical Test (FIT)  * Fecal DNA/Cologuard Test  * Flexible Sigmoidoscopy Age: 54-65 years old   Colonoscopy: every 10 years (may be performed more frequently if at higher risk)  OR  FOBT/FIT: every 1 year  OR  Cologuard: every 3 years  OR  Sigmoidoscopy: every 5 years  Screening may be recommended earlier than age 48 if at higher risk for colorectal cancer  Also, an individualized decision between you and your healthcare provider will decide whether screening between the ages of 74-80 would be appropriate  Colonoscopy: 02/07/2018  FOBT/FIT: Not on file  Cologuard: Not on file  Sigmoidoscopy: Not on file         Breast Cancer Screening Age: 36 years old  Frequency: every 1-2 years  Not required if history of left and right mastectomy Mammogram: 04/03/2019    History Breast Cancer   Cervical Cancer Screening Between the ages of 21-29, pap smear recommended once every 3 years  Between the ages of 33-67, can perform pap smear with HPV co-testing every 5 years     Recommendations may differ for women with a history of total hysterectomy, cervical cancer, or abnormal pap smears in past  Pap Smear: Not on file    Screening Not Indicated   Hepatitis C Screening Once for adults born between 1945 and 1965  More frequently in patients at high risk for Hepatitis C Hep C Antibody: Not on file       Diabetes Screening 1-2 times per year if you're at risk for diabetes or have pre-diabetes Fasting glucose: 91 mg/dL   A1C: No results in last 5 years       Cholesterol Screening Once every 5 years if you don't have a lipid disorder  May order more often based on risk factors  Lipid panel: 02/25/2020    Screening Not Indicated  History Lipid Disorder     Other Preventive Screenings Covered by Medicare:  1  Abdominal Aortic Aneurysm (AAA) Screening: covered once if your at risk  You're considered to be at risk if you have a family history of AAA  2  Lung Cancer Screening: covers low dose CT scan once per year if you meet all of the following conditions: (1) Age 50-69; (2) No signs or symptoms of lung cancer; (3) Current smoker or have quit smoking within the last 15 years; (4) You have a tobacco smoking history of at least 30 pack years (packs per day multiplied by number of years you smoked); (5) You get a written order from a healthcare provider  3  Glaucoma Screening: covered annually if you're considered high risk: (1) You have diabetes OR (2) Family history of glaucoma OR (3)  aged 48 and older OR (3)  American aged 72 and older  3  Osteoporosis Screening: covered every 2 years if you meet one of the following conditions: (1) You're estrogen deficient and at risk for osteoporosis based off medical history and other findings; (2) Have a vertebral abnormality; (3) On glucocorticoid therapy for more than 3 months; (4) Have primary hyperparathyroidism; (5) On osteoporosis medications and need to assess response to drug therapy  · Last bone density test (DXA Scan): Not on file  5  HIV Screening: covered annually if you're between the age of 12-76  Also covered annually if you are younger than 13 and older than 72 with risk factors for HIV infection   For pregnant patients, it is covered up to 3 times per pregnancy  Immunizations:  Immunization Recommendations   Influenza Vaccine Annual influenza vaccination during flu season is recommended for all persons aged >= 6 months who do not have contraindications   Pneumococcal Vaccine (Prevnar and Pneumovax)  * Prevnar = PCV13  * Pneumovax = PPSV23   Adults 25-60 years old: 1-3 doses may be recommended based on certain risk factors  Adults 72 years old: Prevnar (PCV13) vaccine recommended followed by Pneumovax (PPSV23) vaccine  If already received PPSV23 since turning 65, then PCV13 recommended at least one year after PPSV23 dose  Hepatitis B Vaccine 3 dose series if at intermediate or high risk (ex: diabetes, end stage renal disease, liver disease)   Tetanus (Td) Vaccine - COST NOT COVERED BY MEDICARE PART B Following completion of primary series, a booster dose should be given every 10 years to maintain immunity against tetanus  Td may also be given as tetanus wound prophylaxis  Tdap Vaccine - COST NOT COVERED BY MEDICARE PART B Recommended at least once for all adults  For pregnant patients, recommended with each pregnancy  Shingles Vaccine (Shingrix) - COST NOT COVERED BY MEDICARE PART B  2 shot series recommended in those aged 48 and above     Health Maintenance Due:      Topic Date Due    Colonoscopy Surveillance  02/07/2023     Immunizations Due:      Topic Date Due    Influenza Vaccine (1) 09/01/2020     Advance Directives   What are advance directives? Advance directives are legal documents that state your wishes and plans for medical care  These plans are made ahead of time in case you lose your ability to make decisions for yourself  Advance directives can apply to any medical decision, such as the treatments you want, and if you want to donate organs  What are the types of advance directives? There are many types of advance directives, and each state has rules about how to use them   You may choose a combination of any of the following:  · Living will:  This is a written record of the treatment you want  You can also choose which treatments you do not want, which to limit, and which to stop at a certain time  This includes surgery, medicine, IV fluid, and tube feedings  · Durable power of  for healthcare [facility]): This is a written record that states who you want to make healthcare choices for you when you are unable to make them for yourself  This person, called a proxy, is usually a family member or a friend  You may choose more than 1 proxy  · Do not resuscitate (DNR) order:  A DNR order is used in case your heart stops beating or you stop breathing  It is a request not to have certain forms of treatment, such as CPR  A DNR order may be included in other types of advance directives  · Medical directive: This covers the care that you want if you are in a coma, near death, or unable to make decisions for yourself  You can list the treatments you want for each condition  Treatment may include pain medicine, surgery, blood transfusions, dialysis, IV or tube feedings, and a ventilator (breathing machine)  · Values history: This document has questions about your views, beliefs, and how you feel and think about life  This information can help others choose the care that you would choose  Why are advance directives important? An advance directive helps you control your care  Although spoken wishes may be used, it is better to have your wishes written down  Spoken wishes can be misunderstood, or not followed  Treatments may be given even if you do not want them  An advance directive may make it easier for your family to make difficult choices about your care  Weight Management   Why it is important to manage your weight:  Being overweight increases your risk of health conditions such as heart disease, high blood pressure, type 2 diabetes, and certain types of cancer   It can also increase your risk for osteoarthritis, sleep apnea, and other respiratory problems  Aim for a slow, steady weight loss  Even a small amount of weight loss can lower your risk of health problems  How to lose weight safely:  A safe and healthy way to lose weight is to eat fewer calories and get regular exercise  You can lose up about 1 pound a week by decreasing the number of calories you eat by 500 calories each day  Healthy meal plan for weight management:  A healthy meal plan includes a variety of foods, contains fewer calories, and helps you stay healthy  A healthy meal plan includes the following:  · Eat whole-grain foods more often  A healthy meal plan should contain fiber  Fiber is the part of grains, fruits, and vegetables that is not broken down by your body  Whole-grain foods are healthy and provide extra fiber in your diet  Some examples of whole-grain foods are whole-wheat breads and pastas, oatmeal, brown rice, and bulgur  · Eat a variety of vegetables every day  Include dark, leafy greens such as spinach, kale, belkys greens, and mustard greens  Eat yellow and orange vegetables such as carrots, sweet potatoes, and winter squash  · Eat a variety of fruits every day  Choose fresh or canned fruit (canned in its own juice or light syrup) instead of juice  Fruit juice has very little or no fiber  · Eat low-fat dairy foods  Drink fat-free (skim) milk or 1% milk  Eat fat-free yogurt and low-fat cottage cheese  Try low-fat cheeses such as mozzarella and other reduced-fat cheeses  · Choose meat and other protein foods that are low in fat  Choose beans or other legumes such as split peas or lentils  Choose fish, skinless poultry (chicken or turkey), or lean cuts of red meat (beef or pork)  Before you cook meat or poultry, cut off any visible fat  · Use less fat and oil  Try baking foods instead of frying them  Add less fat, such as margarine, sour cream, regular salad dressing and mayonnaise to foods  Eat fewer high-fat foods   Some examples of high-fat foods include french fries, doughnuts, ice cream, and cakes  · Eat fewer sweets  Limit foods and drinks that are high in sugar  This includes candy, cookies, regular soda, and sweetened drinks  Exercise:  Exercise at least 30 minutes per day on most days of the week  Some examples of exercise include walking, biking, dancing, and swimming  You can also fit in more physical activity by taking the stairs instead of the elevator or parking farther away from stores  Ask your healthcare provider about the best exercise plan for you  © Copyright [employer] 2018 Information is for End User's use only and may not be sold, redistributed or otherwise used for commercial purposes   All illustrations and images included in CareNotes® are the copyrighted property of [employer]  or [address] [place], [place] [address]julio c diane

## 2021-02-12 NOTE — PROGRESS NOTES
Assessment and Plan:     Problem List Items Addressed This Visit        Musculoskeletal and Integument    Lumbar spondylosis       Other    Mixed hyperlipidemia    Relevant Orders    CBC and differential    Comprehensive metabolic panel    Lipid Panel with Direct LDL reflex    Anxiety    Relevant Medications    ALPRAZolam (XANAX) 0 25 mg tablet      Other Visit Diagnoses     Medicare annual wellness visit, subsequent    -  Primary        BMI Counseling: Body mass index is 36 03 kg/m²  The BMI is above normal  Nutrition recommendations include encouraging healthy choices of fruits and vegetables and moderation in carbohydrate intake  Preventive health issues were discussed with patient, and age appropriate screening tests were ordered as noted in patient's After Visit Summary  Personalized health advice and appropriate referrals for health education or preventive services given if needed, as noted in patient's After Visit Summary       History of Present Illness:     Patient presents for Medicare Annual Wellness visit    Patient Care Team:  Hasmukh Fung MD as PCP - [address], DO [place] (Vascular Surgery)  Ricco Rabago MD (Vascular Surgery)     Problem List:     Patient Active Problem List   Diagnosis    Breast cancer, stage 1, left ([address] )    [facility]terine cancer [facility] [address] )    History of breast cancer    Pulmonary nodule    Lumbar spondylosis    Spinal stenosis, lumbar region, with neurogenic claudication    Symptomatic varicose veins of both lower extremities    H/O right mastectomy    Mixed hyperlipidemia    Anxiety      Past Medical and Surgical History:     Past Medical History:   Diagnosis Date    Breast cancer ([address] )     B/L    Endometrial cancer ([address] )     PONV (postoperative nausea and vomiting)      Past Surgical History:   Procedure Laterality Date    BREAST LUMPECTOMY Left     BREAST RECONSTRUCTION Right 2010    COLONOSCOPY      JOINT REPLACEMENT Bilateral Knees    KNEE ARTHROSCOPY Bilateral     MASTECTOMY Right     2003    ORTHOPEDIC SURGERY Bilateral     knee arthoscopy    CT LAP,CHOLECYSTECTOMY N/A 2/15/2019    Procedure: CHOLECYSTECTOMY LAPAROSCOPIC;  Surgeon: Juno Butt MD;  [facility]: [facility];  Service: General    RADICAL ABDOMINAL HYSTERECTOMY      REDUCTION MAMMAPLASTY Left       Family History:     Family History   Problem Relation Age of Onset   iMldred Peter Stroke Mother     Hypertension Mother     Cancer Father       Social History:        Social History     Socioeconomic History    Marital status:       Spouse name: None    Number of children: None    Years of education: None    Highest education level: None   Occupational History    Occupation: retired   Social Needs    Financial resource strain: None    Food insecurity     Worry: None     Inability: None    Transportation needs     Medical: None     Non-medical: None   Tobacco Use    Smoking status: Never Smoker    Smokeless tobacco: Never Used   Substance and Sexual Activity    Alcohol use: No    Drug use: No    Sexual activity: Never   Lifestyle    Physical activity     Days per week: None     Minutes per session: None    Stress: None   Relationships    Social connections     Talks on phone: None     Gets together: None     Attends Worship service: None     Active member of club or organization: None     Attends meetings of clubs or organizations: None     Relationship status: None    Intimate partner violence     Fear of current or ex partner: None     Emotionally abused: None     Physically abused: None     Forced sexual activity: None   Other Topics Concern    None   Social History Narrative    Lives with adult daughter      Medications and Allergies:     Current Outpatient Medications   Medication Sig Dispense Refill    ALPRAZolam (XANAX) 0 25 mg tablet Take 1 tablet (0 25 mg total) by mouth daily as needed for anxiety 30 tablet 0    Calcium Carbonate-Vitamin D (CALTRATE 600+D) 600-400 MG-UNIT per tablet Take 1 tablet by mouth 2 (two) times a day       gabapentin (NEURONTIN) 300 mg capsule Take 1 capsule (300 mg total) by mouth 2 (two) times a day 180 capsule 0     No current facility-administered medications for this visit  Allergies   Allergen Reactions    Latex Itching and Rash    Penicillin V Rash    Other Other (See Comments)     rash    Aspirin GI Intolerance    Oxycodone-Acetaminophen GI Intolerance and Headache      Immunizations:     Immunization History   Administered Date(s) Administered    Influenza Split High Dose Preservative Free IM 11/01/2014    Influenza, high dose seasonal 0 7 mL 02/10/2020    Pneumococcal Conjugate 13-Valent 07/15/2019    Pneumococcal Polysaccharide PPV23 11/01/2014      Health Maintenance:         Topic Date Due    Colonoscopy Surveillance  02/07/2023     There are no preventive care reminders to display for this patient  Medicare Health Risk Assessment:     /72   Pulse 71   Temp 97 8 °F (36 6 °C) (Temporal)   Ht 5' 2" (1 575 m)   Wt 89 4 kg (197 lb)   SpO2 98%   BMI 36 03 kg/m²      Jacob is here for her Subsequent Wellness visit  Health Risk Assessment:   Patient rates overall health as good  Patient feels that their physical health rating is same  Eyesight was rated as same  Hearing was rated as same  Patient feels that their emotional and mental health rating is same  Pain experienced in the last 7 days has been none  Patient states that she has experienced no weight loss or gain in last 6 months  Depression Screening:   PHQ-2 Score: 0      Fall Risk Screening: In the past year, patient has experienced: no history of falling in past year      Urinary Incontinence Screening:   Patient has not leaked urine accidently in the last six months  Home Safety:  Patient has trouble with stairs inside or outside of their home   Patient has working smoke alarms and has working carbon monoxide detector  Home safety hazards include: none  Nutrition:   Current diet is Low Carb, Low Saturated Fat and Regular  Medications:   Patient is currently taking over-the-counter supplements  OTC medications include: see medication list  Patient is able to manage medications  Activities of Daily Living (ADLs)/Instrumental Activities of Daily Living (IADLs):   Walk and transfer into and out of bed and chair?: Yes  Dress and groom yourself?: Yes    Bathe or shower yourself?: Yes    Feed yourself? Yes  Do your laundry/housekeeping?: No  Manage your money, pay your bills and track your expenses?: Yes  Make your own meals?: Yes    Do your own shopping?: No    ADL comments: Daughter helps with housekeeping  Durable Medical Equipment Suppliers  none    Previous Hospitalizations:   Any hospitalizations or ED visits within the last 12 months?: No      Advance Care Planning:   Living will: Yes    Durable POA for healthcare: No    Advanced directive: Yes      Cognitive Screening:   Provider or family/friend/caregiver concerned regarding cognition?: No    PREVENTIVE SCREENINGS      Cardiovascular Screening:    General: Screening Not Indicated and History Lipid Disorder      Diabetes Screening:       Due for: Blood Glucose      Colorectal Cancer Screening:     General: Screening Current      Breast Cancer Screening:     General: History Breast Cancer      Cervical Cancer Screening:    General: Screening Not Indicated      Osteoporosis Screening:    General: Screening Current      Abdominal Aortic Aneurysm (AAA) Screening:        General: Screening Not Indicated      Lung Cancer Screening:     General: Screening Not Indicated      Hepatitis C Screening:    General: Screening Not Indicated    Review of Systems   Constitutional: Negative for chills and fever  HENT: Negative for congestion, rhinorrhea and sore throat  Respiratory: Negative for cough and shortness of breath      Cardiovascular: Negative for chest pain, palpitations and leg swelling  Gastrointestinal: Negative for abdominal pain, constipation, diarrhea, nausea and vomiting  Genitourinary: Negative for difficulty urinating  Musculoskeletal: Positive for back pain  Negative for arthralgias and myalgias  Neurological: Negative for dizziness and headaches  Physical Exam  Constitutional:       Appearance: She is well-developed  She is obese  She is not ill-appearing, toxic-appearing or diaphoretic  HENT:      Head: Normocephalic and atraumatic  Eyes:      Conjunctiva/sclera: Conjunctivae normal    Neck:      Musculoskeletal: Neck supple  Cardiovascular:      Rate and Rhythm: Normal rate and regular rhythm  Heart sounds: Normal heart sounds  No murmur  Pulmonary:      Effort: Pulmonary effort is normal  No respiratory distress  Breath sounds: Normal breath sounds  No stridor  No wheezing or rales  Abdominal:      General: There is no distension  Palpations: Abdomen is soft  There is no mass  Tenderness: There is no abdominal tenderness  There is no guarding or rebound  Musculoskeletal:      Right lower leg: No edema  Left lower leg: No edema  Skin:     General: Skin is warm and dry  Neurological:      Mental Status: She is alert and oriented to person, place, and time  Psychiatric:         Mood and Affect: Mood normal          Behavior: Behavior normal          Thought Content:  Thought content normal          Judgment: Judgment normal          Joan Carcamo MD

## 2021-04-17 LAB
ALBUMIN SERPL-MCNC: 4.2 G/DL (ref 3.6–5.1)
ALBUMIN/GLOB SERPL: 1.4 (CALC) (ref 1–2.5)
ALP SERPL-CCNC: 71 U/L (ref 37–153)
ALT SERPL-CCNC: 14 U/L (ref 6–29)
AST SERPL-CCNC: 14 U/L (ref 10–35)
BASOPHILS # BLD AUTO: 32 CELLS/UL (ref 0–200)
BASOPHILS NFR BLD AUTO: 0.4 %
BILIRUB SERPL-MCNC: 0.9 MG/DL (ref 0.2–1.2)
BUN SERPL-MCNC: 14 MG/DL (ref 7–25)
BUN/CREAT SERPL: 14 (CALC) (ref 6–22)
CALCIUM SERPL-MCNC: 10 MG/DL (ref 8.6–10.4)
CHLORIDE SERPL-SCNC: 104 MMOL/L (ref 98–110)
CHOLEST SERPL-MCNC: 242 MG/DL
CHOLEST/HDLC SERPL: 4.8 (CALC)
CO2 SERPL-SCNC: 29 MMOL/L (ref 20–32)
CREAT SERPL-MCNC: 0.98 MG/DL (ref 0.6–0.93)
EOSINOPHIL # BLD AUTO: 170 CELLS/UL (ref 15–500)
EOSINOPHIL NFR BLD AUTO: 2.1 %
ERYTHROCYTE [DISTWIDTH] IN BLOOD BY AUTOMATED COUNT: 13.4 % (ref 11–15)
GLOBULIN SER CALC-MCNC: 3.1 G/DL (CALC) (ref 1.9–3.7)
GLUCOSE SERPL-MCNC: 100 MG/DL (ref 65–99)
HCT VFR BLD AUTO: 44.2 % (ref 35–45)
HDLC SERPL-MCNC: 50 MG/DL
HGB BLD-MCNC: 14.6 G/DL (ref 11.7–15.5)
LDLC SERPL CALC-MCNC: 162 MG/DL (CALC)
LYMPHOCYTES # BLD AUTO: 2098 CELLS/UL (ref 850–3900)
LYMPHOCYTES NFR BLD AUTO: 25.9 %
MCH RBC QN AUTO: 29.6 PG (ref 27–33)
MCHC RBC AUTO-ENTMCNC: 33 G/DL (ref 32–36)
MCV RBC AUTO: 89.7 FL (ref 80–100)
MONOCYTES # BLD AUTO: 713 CELLS/UL (ref 200–950)
MONOCYTES NFR BLD AUTO: 8.8 %
NEUTROPHILS # BLD AUTO: 5087 CELLS/UL (ref 1500–7800)
NEUTROPHILS NFR BLD AUTO: 62.8 %
NONHDLC SERPL-MCNC: 192 MG/DL (CALC)
PLATELET # BLD AUTO: 250 THOUSAND/UL (ref 140–400)
PMV BLD REES-ECKER: 11.7 FL (ref 7.5–12.5)
POTASSIUM SERPL-SCNC: 4.6 MMOL/L (ref 3.5–5.3)
PROT SERPL-MCNC: 7.3 G/DL (ref 6.1–8.1)
RBC # BLD AUTO: 4.93 MILLION/UL (ref 3.8–5.1)
SL AMB EGFR AFRICAN AMERICAN: 64 ML/MIN/1.73M2
SL AMB EGFR NON AFRICAN AMERICAN: 56 ML/MIN/1.73M2
SODIUM SERPL-SCNC: 139 MMOL/L (ref 135–146)
TRIGL SERPL-MCNC: 154 MG/DL
WBC # BLD AUTO: 8.1 THOUSAND/UL (ref 3.8–10.8)

## 2021-04-19 ENCOUNTER — TELEPHONE (OUTPATIENT)
Dept: INTERNAL MEDICINE CLINIC | Facility: CLINIC | Age: 76
End: 2021-04-19

## 2021-04-19 DIAGNOSIS — E78.2 MIXED HYPERLIPIDEMIA: Primary | ICD-10-CM

## 2021-04-19 RX ORDER — ATORVASTATIN CALCIUM 10 MG/1
10 TABLET, FILM COATED ORAL DAILY
Qty: 90 TABLET | Refills: 1 | Status: SHIPPED | OUTPATIENT
Start: 2021-04-19 | End: 2021-11-01 | Stop reason: SDUPTHER

## 2021-04-19 NOTE — TELEPHONE ENCOUNTER
She can start atorvastatin 10mg a day  A common side effect is muscle aches  I will send it to her pharmacy and she can start it anytime  She should get her blood work again in 4months   Orders entered

## 2021-04-19 NOTE — TELEPHONE ENCOUNTER
Patient saw her lab results on MyChart and she said her cholesterol is high she asked what your recommendations are or if she should come in to discuss?

## 2021-06-28 DIAGNOSIS — G89.29 CHRONIC PAIN OF BOTH KNEES: Primary | ICD-10-CM

## 2021-06-28 DIAGNOSIS — M25.562 CHRONIC PAIN OF BOTH KNEES: Primary | ICD-10-CM

## 2021-06-28 DIAGNOSIS — M25.561 CHRONIC PAIN OF BOTH KNEES: Primary | ICD-10-CM

## 2021-07-02 ENCOUNTER — APPOINTMENT (OUTPATIENT)
Dept: RADIOLOGY | Facility: MEDICAL CENTER | Age: 76
End: 2021-07-02
Payer: COMMERCIAL

## 2021-07-02 ENCOUNTER — OFFICE VISIT (OUTPATIENT)
Dept: OBGYN CLINIC | Facility: MEDICAL CENTER | Age: 76
End: 2021-07-02
Payer: COMMERCIAL

## 2021-07-02 VITALS
WEIGHT: 197.4 LBS | HEIGHT: 62 IN | BODY MASS INDEX: 36.33 KG/M2 | SYSTOLIC BLOOD PRESSURE: 148 MMHG | DIASTOLIC BLOOD PRESSURE: 81 MMHG | HEART RATE: 65 BPM

## 2021-07-02 DIAGNOSIS — T84.84XA PAIN OF BOTH LOWER EXTREMITIES DUE TO BILATERAL TOTAL KNEE REPLACEMENTS, INITIAL ENCOUNTER (HCC): ICD-10-CM

## 2021-07-02 DIAGNOSIS — M25.562 CHRONIC PAIN OF BOTH KNEES: ICD-10-CM

## 2021-07-02 DIAGNOSIS — Z96.653 HISTORY OF TOTAL KNEE REPLACEMENT, BILATERAL: Primary | ICD-10-CM

## 2021-07-02 DIAGNOSIS — M25.561 CHRONIC PAIN OF BOTH KNEES: ICD-10-CM

## 2021-07-02 DIAGNOSIS — Z96.653 HISTORY OF TOTAL KNEE REPLACEMENT, BILATERAL: ICD-10-CM

## 2021-07-02 DIAGNOSIS — Z96.653 PAIN OF BOTH LOWER EXTREMITIES DUE TO BILATERAL TOTAL KNEE REPLACEMENTS, INITIAL ENCOUNTER (HCC): ICD-10-CM

## 2021-07-02 DIAGNOSIS — G89.29 CHRONIC PAIN OF BOTH KNEES: ICD-10-CM

## 2021-07-02 PROCEDURE — 1036F TOBACCO NON-USER: CPT | Performed by: ORTHOPAEDIC SURGERY

## 2021-07-02 PROCEDURE — 73562 X-RAY EXAM OF KNEE 3: CPT

## 2021-07-02 PROCEDURE — 1160F RVW MEDS BY RX/DR IN RCRD: CPT | Performed by: ORTHOPAEDIC SURGERY

## 2021-07-02 PROCEDURE — 99204 OFFICE O/P NEW MOD 45 MIN: CPT | Performed by: ORTHOPAEDIC SURGERY

## 2021-07-02 RX ORDER — DICLOFENAC SODIUM 30 MG/G
2 GEL TOPICAL 2 TIMES DAILY PRN
Qty: 100 G | Refills: 1 | Status: SHIPPED | OUTPATIENT
Start: 2021-07-02 | End: 2022-06-18 | Stop reason: SDUPTHER

## 2021-07-02 NOTE — LETTER
July 2, 2021     [facility], [address] [facility] [address]  [address] [address]    Patient: Meme Cortez   YOB: 1945   Date of Visit: 7/2/2021       Dear Dr Reno [facility]: Thank you for referring Meme Cortez to me for evaluation  Below are my notes for this consultation  If you have questions, please do not hesitate to call me  I look forward to following your patient along with you  Sincerely,        Marlen Vasquez DO        CC: No Recipients  Marlen Vasquez DO  7/2/2021  5:21 PM  Sign when Signing Visit  Assessment/Plan:  1  History of total knee replacement, bilateral    2  Chronic pain of both knees    3  Pain of both lower extremities due to bilateral total knee replacements, initial encounter ([facility] )      Orders Placed This Encounter   Procedures    XR knee 3 vw left non injury    XR knee 3 vw right non injury    NM bone scan 3 phase    CBC and differential    C-reactive protein    Sedimentation rate, automated    Ambulatory referral to Physical Therapy    Ambulatory referral to Pain Management       · Ms Toby Baumann has painful bilateral knee replacements  They appear stable on exam   Will proceed with work up to rule out infection and loosening  · Patient will also consider PT  · Declined bracing  · voltaren gel 3% prescribed, use prn pain  · Continue tylenol prn pain  · Referral to pain mgt provided  Return for appt after testing  I answered all of the patient's questions during the visit and provided education of the patient's condition during the visit  The patient verbalized understanding of the information given and agrees with the plan  This note was dictated using M*Modal software  It may contain errors including improperly dictated words  Please contact physician directly for any questions  Subjective   Chief Complaint:   Chief Complaint   Patient presents with    Left Knee - Pain    Right Knee - Pain       HPI  Meme Cortez is a 67 y o  female who presents for follow up for bilateral knee pain  Patient has a history of left TKA in 2005 in [place]  Patient states she did well with that surgery and had no complications  Patient has history of right TKA in 2015 with Dr Raven Patel of [facility]  Patient states she did well after that surgery and had no complications as well  Patient notes that she has developed progressively worsening pain over the anterior and inferior aspect of bilateral knees  She states pain is worse with prolonged walking or sitting  She states her knees feel stiff at times  Denies instability or locking  Patient is taking gabapentin for pain  She is not currently doing any PT but had after her surgeries  She states she is active with walking  Denies any initial injury prior to knee pain onset  Review of Systems  ROS:    See HPI for musculoskeletal review     All other systems reviewed are negative     History:  Past Medical History:   Diagnosis Date    Breast cancer ([facility] )     B/L    Endometrial cancer ([facility] )     PONV (postoperative nausea and vomiting)      Past Surgical History:   Procedure Laterality Date    BREAST LUMPECTOMY Left     BREAST RECONSTRUCTION Right 2010    COLONOSCOPY      JOINT REPLACEMENT Bilateral     Knees    KNEE ARTHROSCOPY Bilateral     MASTECTOMY Right     2003    ORTHOPEDIC SURGERY Bilateral     knee arthoscopy    [place] LAP,CHOLECYSTECTOMY N/A 2/15/2019    Procedure: CHOLECYSTECTOMY LAPAROSCOPIC;  Surgeon: Jw Sanchez MD;  [facility];  Service: General    RADICAL ABDOMINAL HYSTERECTOMY      REDUCTION MAMMAPLASTY Left      Social History   Social History     Substance and Sexual Activity   Alcohol Use No     Social History     Substance and Sexual Activity   Drug Use No     Social History     Tobacco Use   Smoking Status Never Smoker   Smokeless Tobacco Never Used     Family History:   Family History   Problem Relation Age of Onset    Stroke Mother     Hypertension Mother     Cancer Father        Current Outpatient Medications on File Prior to Visit   Medication Sig Dispense Refill    ALPRAZolam (XANAX) 0 25 mg tablet Take 1 tablet (0 25 mg total) by mouth daily as needed for anxiety 30 tablet 0    atorvastatin (LIPITOR) 10 mg tablet Take 1 tablet (10 mg total) by mouth daily 90 tablet 1    Calcium Carbonate-Vitamin D (CALTRATE 600+D) 600-400 MG-UNIT per tablet Take 1 tablet by mouth 2 (two) times a day       gabapentin (NEURONTIN) 300 mg capsule Take 1 capsule (300 mg total) by mouth 2 (two) times a day 180 capsule 0     No current facility-administered medications on file prior to visit  Allergies   Allergen Reactions    Latex Itching and Rash    Penicillin V Rash    Other Other (See Comments)     rash    Aspirin GI Intolerance    Oxycodone-Acetaminophen GI Intolerance and Headache        Objective     /81   Pulse 65   Ht 5' 2" (1 575 m)   Wt 89 5 kg (197 lb 6 4 oz)   BMI 36 10 kg/m²      PE:  AAOx 3  WDWN  Hearing intact, no drainage from eyes  no audible wheezing  no abdominal distension  LE compartments soft, skin intact    right knee: Anterior scar, well healed  No swelling  No erythema  No effusion  TTP  Over patella, patellar tendon, pes anserine bursa  AROM: 0-110  PROM: 0-120  Stable with varus stress at 0 degrees  Stable with valgus stress at 0 degrees  Mild laxity with varus stress at 30 degrees  Stable with valgus stress at 30 degrees  Stable in anterior to posterior direction at 0 degrees of flexion    left knee:   Anterior scar, well healed  No swelling  No erythema  No effusion  TTP over   Medial and lateral joint line, pes anserine bursa, patella, patellar tendon  AROM: 0-110  PROM: 0-125  Stable with varus stress at 0 degrees  Stable with valgus stress at 0 degrees  Mild laxity with varus stress at 30 degrees  Stable with valgus stress at 30 degrees  Stable in anterior to posterior direction at 0 degrees of flexion    Ipsilateral hip:  No pain with ROM    Imaging Studies: I have personally reviewed pertinent films in PACS  X-rays right knee: s/p R TKA,  Adequate alignment of implants  X-rays left knee: status post left TKA,  possible lucency at medial aspect of tibial component, possible tibial subsidence vs anterior angulation of tibial component

## 2021-07-02 NOTE — PROGRESS NOTES
Assessment/Plan:  1  History of total knee replacement, bilateral    2  Chronic pain of both knees    3  Pain of both lower extremities due to bilateral total knee replacements, initial encounter ([facility] )      Orders Placed This Encounter   Procedures    XR knee 3 vw left non injury    XR knee 3 vw right non injury    NM bone scan 3 phase    CBC and differential    C-reactive protein    Sedimentation rate, automated    Ambulatory referral to Physical Therapy    Ambulatory referral to Pain Management       · Ms Bianca Shipman has painful bilateral knee replacements  They appear stable on exam   Will proceed with work up to rule out infection and loosening  · Patient will also consider PT  · Declined bracing  · voltaren gel 3% prescribed, use prn pain  · Continue tylenol prn pain  · Referral to pain mgt provided  Return for appt after testing  I answered all of the patient's questions during the visit and provided education of the patient's condition during the visit  The patient verbalized understanding of the information given and agrees with the plan  This note was dictated using [employer] software  It may contain errors including improperly dictated words  Please contact physician directly for any questions  Subjective   Chief Complaint:   Chief Complaint   Patient presents with    Left Knee - Pain    Right Knee - Pain       GUSTAVO  Vanessa Pino is a 67 y o  female who presents for follow up for bilateral knee pain  Patient has a history of left TKA in 2005 in [place]  Patient states she did well with that surgery and had no complications  Patient has history of right TKA in 2015 with Dr Polly Calvo of [facility]  Patient states she did well after that surgery and had no complications as well  Patient notes that she has developed progressively worsening pain over the anterior and inferior aspect of bilateral knees  She states pain is worse with prolonged walking or sitting   She states her knees feel stiff at times  Denies instability or locking  Patient is taking gabapentin for pain  She is not currently doing any PT but had after her surgeries  She states she is active with walking  Denies any initial injury prior to knee pain onset  Review of Systems  ROS:    See HPI for musculoskeletal review     All other systems reviewed are negative     History:  Past Medical History:   Diagnosis Date    Breast cancer ([facility] )     B/L    Endometrial cancer ([facility] )     PONV (postoperative nausea and vomiting)      Past Surgical History:   Procedure Laterality Date    BREAST LUMPECTOMY Left     BREAST RECONSTRUCTION Right 2010    COLONOSCOPY      JOINT REPLACEMENT Bilateral     Knees    KNEE ARTHROSCOPY Bilateral     MASTECTOMY Right     2003    ORTHOPEDIC SURGERY Bilateral     knee arthoscopy    [place] LAP,CHOLECYSTECTOMY N/A 2/15/2019    Procedure: CHOLECYSTECTOMY LAPAROSCOPIC;  Surgeon: Pushpa Rico MD;  [facility]: [facility];  Service: General    RADICAL ABDOMINAL HYSTERECTOMY      REDUCTION MAMMAPLASTY Left      Social History   Social History     Substance and Sexual Activity   Alcohol Use No     Social History     Substance and Sexual Activity   Drug Use No     Social History     Tobacco Use   Smoking Status Never Smoker   Smokeless Tobacco Never Used     Family History:   Family History   Problem Relation Age of Onset    Stroke Mother     Hypertension Mother     Cancer Father        Current Outpatient Medications on File Prior to Visit   Medication Sig Dispense Refill    ALPRAZolam (XANAX) 0 25 mg tablet Take 1 tablet (0 25 mg total) by mouth daily as needed for anxiety 30 tablet 0    atorvastatin (LIPITOR) 10 mg tablet Take 1 tablet (10 mg total) by mouth daily 90 tablet 1    Calcium Carbonate-Vitamin D (CALTRATE 600+D) 600-400 MG-UNIT per tablet Take 1 tablet by mouth 2 (two) times a day       gabapentin (NEURONTIN) 300 mg capsule Take 1 capsule (300 mg total) by mouth 2 (two) times a day 180 capsule 0     No current facility-administered medications on file prior to visit  Allergies   Allergen Reactions    Latex Itching and Rash    Penicillin V Rash    Other Other (See Comments)     rash    Aspirin GI Intolerance    Oxycodone-Acetaminophen GI Intolerance and Headache        Objective     /81   Pulse 65   Ht 5' 2" (1 575 m)   Wt 89 5 kg (197 lb 6 4 oz)   BMI 36 10 kg/m²      PE:  AAOx 3  WDWN  Hearing intact, no drainage from eyes  no audible wheezing  no abdominal distension  LE compartments soft, skin intact    right knee: Anterior scar, well healed  No swelling  No erythema  No effusion  TTP  Over patella, patellar tendon, pes anserine bursa  AROM: 0-110  PROM: 0-120  Stable with varus stress at 0 degrees  Stable with valgus stress at 0 degrees  Mild laxity with varus stress at 30 degrees  Stable with valgus stress at 30 degrees  Stable in anterior to posterior direction at 0 degrees of flexion    left knee:   Anterior scar, well healed  No swelling  No erythema  No effusion  TTP over   Medial and lateral joint line, pes anserine bursa, patella, patellar tendon  AROM: 0-110  PROM: 0-125  Stable with varus stress at 0 degrees  Stable with valgus stress at 0 degrees  Mild laxity with varus stress at 30 degrees  Stable with valgus stress at 30 degrees  Stable in anterior to posterior direction at 0 degrees of flexion    Ipsilateral hip:  No pain with ROM    Imaging Studies: I have personally reviewed pertinent films in PACS  X-rays right knee: s/p R TKA,  Adequate alignment of implants  X-rays left knee: status post left TKA,  possible lucency at medial aspect of tibial component, possible tibial subsidence vs anterior angulation of tibial component

## 2021-08-09 ENCOUNTER — OFFICE VISIT (OUTPATIENT)
Dept: PAIN MEDICINE | Facility: MEDICAL CENTER | Age: 76
End: 2021-08-09
Payer: COMMERCIAL

## 2021-08-09 VITALS
HEART RATE: 77 BPM | HEIGHT: 62 IN | WEIGHT: 197 LBS | BODY MASS INDEX: 36.25 KG/M2 | TEMPERATURE: 97.7 F | DIASTOLIC BLOOD PRESSURE: 64 MMHG | SYSTOLIC BLOOD PRESSURE: 110 MMHG

## 2021-08-09 DIAGNOSIS — G89.29 CHRONIC PAIN OF BOTH KNEES: ICD-10-CM

## 2021-08-09 DIAGNOSIS — T84.84XA PAIN OF BOTH LOWER EXTREMITIES DUE TO BILATERAL TOTAL KNEE REPLACEMENTS, INITIAL ENCOUNTER (HCC): ICD-10-CM

## 2021-08-09 DIAGNOSIS — S86.811A STRAIN OF TIBIALIS ANTERIOR MUSCLE, RIGHT, INITIAL ENCOUNTER: ICD-10-CM

## 2021-08-09 DIAGNOSIS — Z96.653 HISTORY OF TOTAL KNEE REPLACEMENT, BILATERAL: ICD-10-CM

## 2021-08-09 DIAGNOSIS — Z96.653 PAIN OF BOTH LOWER EXTREMITIES DUE TO BILATERAL TOTAL KNEE REPLACEMENTS, INITIAL ENCOUNTER (HCC): ICD-10-CM

## 2021-08-09 DIAGNOSIS — S86.812A STRAIN OF LEFT TIBIALIS ANTERIOR MUSCLE, INITIAL ENCOUNTER: Primary | ICD-10-CM

## 2021-08-09 DIAGNOSIS — M25.562 CHRONIC PAIN OF BOTH KNEES: ICD-10-CM

## 2021-08-09 DIAGNOSIS — E66.01 OBESITY, MORBID (HCC): ICD-10-CM

## 2021-08-09 DIAGNOSIS — M25.561 CHRONIC PAIN OF BOTH KNEES: ICD-10-CM

## 2021-08-09 PROCEDURE — 99214 OFFICE O/P EST MOD 30 MIN: CPT | Performed by: PHYSICAL MEDICINE & REHABILITATION

## 2021-08-09 NOTE — PROGRESS NOTES
Assessment  1  Strain of left tibialis anterior muscle, initial encounter    2  Strain of tibialis anterior muscle, right, initial encounter    3  Chronic pain of both knees    4  History of total knee replacement, bilateral    5  Pain of both lower extremities due to bilateral total knee replacements, initial encounter ([facility] )    6  Obesity, morbid ([facility] )        Plan  1  At this time I would like to initiate physical therapy for general strengthening conditioning of her legs  She is having pain directly in the tibialis anterior muscles after increasing her walking sit significantly while visiting her daughter in [place]  2  I did also ask her to complete the workup started by Dr James Zamora including laboratory studies and nuclear medicine bone scan  She verbalized understanding and agreement  3  I would like her to follow up with me at the conclusion of physical therapy to see if she is continuing to have symptoms and if there is anything else we can do for her  My impressions and treatment recommendations were discussed in detail with the patient who verbalized understanding and had no further questions  Discharge instructions were provided  I personally saw and examined the patient and I agree with the above discussed plan of care  Orders Placed This Encounter   Procedures    Ambulatory referral to Physical Therapy     Standing Status:   Future     Standing Expiration Date:   8/9/2022     Referral Priority:   Routine     Referral Type:   Physical Therapy     Referral Reason:   Specialty Services Required     Requested Specialty:   Physical Therapy     Number of Visits Requested:   1     Expiration Date:   8/9/2022     No orders of the defined types were placed in this encounter  History of Present Illness    Jacob Conrad is a 67 y o  female returns in follow-up for evaluation of her bilateral tibialis anterior pain at the request of Dr James Zamora    The patient has been experiencing significant pain which is worsening over time especially with long distance walking  She wakes up with pain in the and has pain throughout the day depending on her activity level  She is interested in maintaining and increasing her activity with walking so she can enjoy her snf  She rates her pain as a 9/10 currently and describes that the pain is constant as a dull aching sensation in the tibialis anterior musculature bilaterally  This is tender to touch and also reproduced with active contraction of the muscles  This is leading to significant functional deficits especially with family at home responsibilities, recreation, and social activity  She did have evaluation with vascular team and did have a finding of a blood clot which has since been treated with anticoagulants  Despite clear vascular study on follow-up she continues with this pain  I have personally reviewed and/or updated the patient's past medical history, past surgical history, family history, social history, current medications, allergies, and vital signs today  Review of Systems   Respiratory: Negative for shortness of breath  Cardiovascular: Negative for chest pain  Gastrointestinal: Negative for constipation, diarrhea, nausea and vomiting  Musculoskeletal: Positive for gait problem and joint swelling (right leg)  Negative for arthralgias and myalgias  Skin: Negative for rash  Neurological: Negative for dizziness, seizures and weakness  All other systems reviewed and are negative        Patient Active Problem List   Diagnosis    Breast cancer, stage 1, left ([facility] )    Uterine cancer ([facility] )    History of breast cancer    Pulmonary nodule    Lumbar spondylosis    Spinal stenosis, lumbar region, with neurogenic claudication    Symptomatic varicose veins of both lower extremities    H/O right mastectomy    Mixed hyperlipidemia    Anxiety    Obesity, morbid ([facility] )       Past Medical History:   Diagnosis Date    Breast cancer ([facility] )     B/L    Cancer ([facility] )     Chronic low back pain     Endometrial cancer (HCC)     PONV (postoperative nausea and vomiting)        Past Surgical History:   Procedure Laterality Date    BREAST LUMPECTOMY Left     BREAST RECONSTRUCTION Right 2010    COLONOSCOPY      JOINT REPLACEMENT Bilateral     KNEE ARTHROSCOPY Bilateral     MASTECTOMY Right     2003    ORTHOPEDIC SURGERY Bilateral     knee arthoscopy    [place] LAP,CHOLECYSTECTOMY N/A 2/15/2019    Procedure: CHOLECYSTECTOMY LAPAROSCOPIC;  Surgeon: Lynda Huntley MD;  [facility];  Service: General    RADICAL ABDOMINAL HYSTERECTOMY      REDUCTION MAMMAPLASTY Left        Family History   Problem Relation Age of Onset    Stroke Mother     Hypertension Mother     Cancer Father        Social History     Occupational History    Occupation: retired   Tobacco Use    Smoking status: Never Smoker    Smokeless tobacco: Never Used   Vaping Use    Vaping Use: Never used   Substance and Sexual Activity    Alcohol use: No    Drug use: No    Sexual activity: Never       Current Outpatient Medications on File Prior to Visit   Medication Sig    ALPRAZolam (XANAX) 0 25 mg tablet Take 1 tablet (0 25 mg total) by mouth daily as needed for anxiety    atorvastatin (LIPITOR) 10 mg tablet Take 1 tablet (10 mg total) by mouth daily    Calcium Carbonate-Vitamin D (CALTRATE 600+D) 600-400 MG-UNIT per tablet Take 1 tablet by mouth 2 (two) times a day     Diclofenac Sodium 3 % GEL Apply 2 g topically 2 (two) times a day as needed (pain) GI intolerance with ASA Unable to take nsaids due to age   Kimberly [place] gabapentin (NEURONTIN) 300 mg capsule Take 1 capsule (300 mg total) by mouth 2 (two) times a day     No current facility-administered medications on file prior to visit         Allergies   Allergen Reactions    Latex Itching and Rash    Penicillin V Rash    Other Other (See Comments)     rash    Aspirin GI Intolerance    Oxycodone-Acetaminophen GI Intolerance and Headache       Physical Exam    /64   Pulse 77   Temp 97 7 °F (36 5 °C)   Ht 5' 2" (1 575 m)   Wt 89 4 kg (197 lb)   BMI 36 03 kg/m²     Constitutional: normal, well developed, well nourished, alert, in no distress and non-toxic and no overt pain behavior  Eyes: anicteric  HEENT: grossly intact  Neck:  symmetric, trachea midline and no masses   Pulmonary:even and unlabored  Cardiovascular:No edema or pitting edema present  Skin:Normal without rashes or lesions and well hydrated  Psychiatric:Mood and affect appropriate  Neurologic:Cranial Nerves II-XII grossly intact  Musculoskeletal:normal, except for tenderness to palpation over the tibialis anterior muscle bellies bilaterally reproducing her pain complaint, there is also pain reproduced with active dorsiflexion and wall heel walking      Imaging

## 2021-08-23 DIAGNOSIS — F41.9 ANXIETY: ICD-10-CM

## 2021-08-24 RX ORDER — ALPRAZOLAM 0.25 MG/1
0.25 TABLET ORAL DAILY PRN
Qty: 30 TABLET | Refills: 0 | Status: SHIPPED | OUTPATIENT
Start: 2021-08-24 | End: 2021-11-01 | Stop reason: SDUPTHER

## 2021-09-16 ENCOUNTER — RA CDI HCC (OUTPATIENT)
Dept: OTHER | Facility: HOSPITAL | Age: 76
End: 2021-09-16

## 2021-09-16 NOTE — PROGRESS NOTES
[facility]  coding opportunities       Chart reviewed, no opportunity found: CHART REVIEWED, NO OPPORTUNITY FOUND      * per surg onc note; breast cancer is history of                    Patients insurance company: [employer] (Medicare Advantage and Commercial)

## 2021-09-22 ENCOUNTER — OFFICE VISIT (OUTPATIENT)
Dept: INTERNAL MEDICINE CLINIC | Facility: CLINIC | Age: 76
End: 2021-09-22
Payer: COMMERCIAL

## 2021-09-22 VITALS
SYSTOLIC BLOOD PRESSURE: 150 MMHG | HEIGHT: 62 IN | WEIGHT: 199 LBS | TEMPERATURE: 98 F | OXYGEN SATURATION: 96 % | DIASTOLIC BLOOD PRESSURE: 90 MMHG | BODY MASS INDEX: 36.62 KG/M2 | HEART RATE: 76 BPM

## 2021-09-22 DIAGNOSIS — S86.811D STRAIN OF RIGHT TIBIALIS ANTERIOR MUSCLE, SUBSEQUENT ENCOUNTER: ICD-10-CM

## 2021-09-22 DIAGNOSIS — S86.812D STRAIN OF LEFT TIBIALIS ANTERIOR MUSCLE, SUBSEQUENT ENCOUNTER: Primary | ICD-10-CM

## 2021-09-22 PROCEDURE — 1036F TOBACCO NON-USER: CPT | Performed by: INTERNAL MEDICINE

## 2021-09-22 PROCEDURE — 1160F RVW MEDS BY RX/DR IN RCRD: CPT | Performed by: INTERNAL MEDICINE

## 2021-09-22 PROCEDURE — 99214 OFFICE O/P EST MOD 30 MIN: CPT | Performed by: INTERNAL MEDICINE

## 2021-09-22 RX ORDER — CELECOXIB 50 MG/1
50 CAPSULE ORAL 2 TIMES DAILY
Qty: 60 CAPSULE | Refills: 2 | Status: SHIPPED | OUTPATIENT
Start: 2021-09-22 | End: 2022-04-21

## 2021-09-22 NOTE — PROGRESS NOTES
Assessment/Plan:  Try Celebrex   Schedule PT  Obtain labs previously ordered by myself and ortho       Problem List Items Addressed This Visit     None      Visit Diagnoses     Strain of left tibialis anterior muscle, subsequent encounter    -  Primary    Relevant Medications    celecoxib (CeleBREX) 50 MG capsule    Strain of right tibialis anterior muscle, subsequent encounter        Relevant Medications    celecoxib (CeleBREX) 50 MG capsule            Subjective:      Patient ID: Tien Rausch is a 67 y o  female  HPI  Here for a follow up  C/o pain in the outer calf right >left   She saw ortho and pain mgt dx: strain of the tibialis anterior muscle  She has pain when she stands, walks  S/p bilateral knee replacements that look fine on Xrays  No DVT on dopplers  NSAIDs cause GI upset  She takes gabapentin for lumbar spondylosis    The following portions of the patient's history were reviewed and updated as appropriate: allergies, current medications, past family history, past medical history, past social history, past surgical history and problem list     Review of Systems   Constitutional: Negative for chills, fever and unexpected weight change  Respiratory: Negative for cough and shortness of breath  Cardiovascular: Negative for chest pain, palpitations and leg swelling  Gastrointestinal: Negative for abdominal pain, constipation and diarrhea  Genitourinary: Negative for difficulty urinating  Musculoskeletal: Positive for myalgias  Neurological: Negative for dizziness  Objective:      /90   Pulse 76   Temp 98 °F (36 7 °C) (Temporal)   Ht 5' 2" (1 575 m)   Wt 90 3 kg (199 lb)   SpO2 96%   BMI 36 40 kg/m²          Physical Exam  Constitutional:       General: She is not in acute distress  Appearance: She is well-developed  She is obese  She is not ill-appearing or toxic-appearing  HENT:      Head: Normocephalic and atraumatic     Eyes:      Conjunctiva/sclera: Conjunctivae normal    Cardiovascular:      Rate and Rhythm: Normal rate and regular rhythm  Heart sounds: Normal heart sounds  No murmur heard  Pulmonary:      Effort: Pulmonary effort is normal  No respiratory distress  Breath sounds: Normal breath sounds  No wheezing or rales  Abdominal:      General: There is no distension  Palpations: Abdomen is soft  There is no mass  Tenderness: There is no abdominal tenderness  There is no guarding or rebound  Musculoskeletal:         General: Tenderness (right outer calf) present  Cervical back: Neck supple  Right lower leg: No edema  Left lower leg: No edema  Skin:     General: Skin is warm and dry  Neurological:      Mental Status: She is alert and oriented to person, place, and time  Psychiatric:         Behavior: Behavior normal          Thought Content:  Thought content normal          Judgment: Judgment normal

## 2021-11-01 DIAGNOSIS — E78.2 MIXED HYPERLIPIDEMIA: ICD-10-CM

## 2021-11-01 DIAGNOSIS — F41.9 ANXIETY: ICD-10-CM

## 2021-11-02 RX ORDER — ATORVASTATIN CALCIUM 10 MG/1
10 TABLET, FILM COATED ORAL DAILY
Qty: 90 TABLET | Refills: 0 | Status: SHIPPED | OUTPATIENT
Start: 2021-11-02 | End: 2022-04-06

## 2021-11-02 RX ORDER — ALPRAZOLAM 0.25 MG/1
0.25 TABLET ORAL DAILY PRN
Qty: 30 TABLET | Refills: 0 | Status: SHIPPED | OUTPATIENT
Start: 2021-11-02 | End: 2022-06-17 | Stop reason: SDUPTHER

## 2021-12-23 ENCOUNTER — TELEPHONE (OUTPATIENT)
Dept: INTERNAL MEDICINE CLINIC | Facility: CLINIC | Age: 76
End: 2021-12-23

## 2021-12-27 ENCOUNTER — APPOINTMENT (OUTPATIENT)
Dept: LAB | Age: 76
End: 2021-12-27
Payer: COMMERCIAL

## 2021-12-27 DIAGNOSIS — E78.2 MIXED HYPERLIPIDEMIA: ICD-10-CM

## 2021-12-27 LAB
ALBUMIN SERPL BCP-MCNC: 3.9 G/DL (ref 3.5–5)
ALP SERPL-CCNC: 69 U/L (ref 46–116)
ALT SERPL W P-5'-P-CCNC: 21 U/L (ref 12–78)
ANION GAP SERPL CALCULATED.3IONS-SCNC: 4 MMOL/L (ref 4–13)
AST SERPL W P-5'-P-CCNC: 11 U/L (ref 5–45)
BASOPHILS # BLD AUTO: 0.05 THOUSANDS/ΜL (ref 0–0.1)
BASOPHILS NFR BLD AUTO: 1 % (ref 0–1)
BILIRUB SERPL-MCNC: 1.06 MG/DL (ref 0.2–1)
BUN SERPL-MCNC: 13 MG/DL (ref 5–25)
CALCIUM SERPL-MCNC: 9.6 MG/DL (ref 8.3–10.1)
CHLORIDE SERPL-SCNC: 110 MMOL/L (ref 100–108)
CHOLEST SERPL-MCNC: 189 MG/DL
CO2 SERPL-SCNC: 29 MMOL/L (ref 21–32)
CREAT SERPL-MCNC: 0.98 MG/DL (ref 0.6–1.3)
EOSINOPHIL # BLD AUTO: 0.16 THOUSAND/ΜL (ref 0–0.61)
EOSINOPHIL NFR BLD AUTO: 2 % (ref 0–6)
ERYTHROCYTE [DISTWIDTH] IN BLOOD BY AUTOMATED COUNT: 14.3 % (ref 11.6–15.1)
GFR SERPL CREATININE-BSD FRML MDRD: 55 ML/MIN/1.73SQ M
GLUCOSE P FAST SERPL-MCNC: 97 MG/DL (ref 65–99)
HCT VFR BLD AUTO: 41.7 % (ref 34.8–46.1)
HDLC SERPL-MCNC: 53 MG/DL
HGB BLD-MCNC: 14.1 G/DL (ref 11.5–15.4)
IMM GRANULOCYTES # BLD AUTO: 0.03 THOUSAND/UL (ref 0–0.2)
IMM GRANULOCYTES NFR BLD AUTO: 0 % (ref 0–2)
LDLC SERPL CALC-MCNC: 110 MG/DL (ref 0–100)
LYMPHOCYTES # BLD AUTO: 2.58 THOUSANDS/ΜL (ref 0.6–4.47)
LYMPHOCYTES NFR BLD AUTO: 32 % (ref 14–44)
MCH RBC QN AUTO: 29.3 PG (ref 26.8–34.3)
MCHC RBC AUTO-ENTMCNC: 33.8 G/DL (ref 31.4–37.4)
MCV RBC AUTO: 87 FL (ref 82–98)
MONOCYTES # BLD AUTO: 0.7 THOUSAND/ΜL (ref 0.17–1.22)
MONOCYTES NFR BLD AUTO: 9 % (ref 4–12)
NEUTROPHILS # BLD AUTO: 4.62 THOUSANDS/ΜL (ref 1.85–7.62)
NEUTS SEG NFR BLD AUTO: 56 % (ref 43–75)
NRBC BLD AUTO-RTO: 0 /100 WBCS
PLATELET # BLD AUTO: 211 THOUSANDS/UL (ref 149–390)
PMV BLD AUTO: 11.9 FL (ref 8.9–12.7)
POTASSIUM SERPL-SCNC: 4.1 MMOL/L (ref 3.5–5.3)
PROT SERPL-MCNC: 7.5 G/DL (ref 6.4–8.2)
RBC # BLD AUTO: 4.81 MILLION/UL (ref 3.81–5.12)
SODIUM SERPL-SCNC: 143 MMOL/L (ref 136–145)
TRIGL SERPL-MCNC: 130 MG/DL
WBC # BLD AUTO: 8.14 THOUSAND/UL (ref 4.31–10.16)

## 2021-12-27 PROCEDURE — 80053 COMPREHEN METABOLIC PANEL: CPT | Performed by: INTERNAL MEDICINE

## 2021-12-27 PROCEDURE — 85025 COMPLETE CBC W/AUTO DIFF WBC: CPT | Performed by: INTERNAL MEDICINE

## 2021-12-27 PROCEDURE — 36415 COLL VENOUS BLD VENIPUNCTURE: CPT | Performed by: INTERNAL MEDICINE

## 2021-12-27 PROCEDURE — 80061 LIPID PANEL: CPT | Performed by: INTERNAL MEDICINE

## 2021-12-28 ENCOUNTER — OFFICE VISIT (OUTPATIENT)
Dept: INTERNAL MEDICINE CLINIC | Facility: CLINIC | Age: 76
End: 2021-12-28
Payer: COMMERCIAL

## 2021-12-28 VITALS
WEIGHT: 198 LBS | SYSTOLIC BLOOD PRESSURE: 146 MMHG | TEMPERATURE: 97.6 F | HEIGHT: 64 IN | BODY MASS INDEX: 33.8 KG/M2 | DIASTOLIC BLOOD PRESSURE: 90 MMHG

## 2021-12-28 DIAGNOSIS — Z23 NEED FOR VACCINATION: ICD-10-CM

## 2021-12-28 DIAGNOSIS — K62.5 BLOOD PER RECTUM: ICD-10-CM

## 2021-12-28 DIAGNOSIS — K64.4 EXTERNAL HEMORRHOIDS: Primary | ICD-10-CM

## 2021-12-28 PROCEDURE — 90662 IIV NO PRSV INCREASED AG IM: CPT

## 2021-12-28 PROCEDURE — 99214 OFFICE O/P EST MOD 30 MIN: CPT | Performed by: INTERNAL MEDICINE

## 2021-12-28 PROCEDURE — 90471 IMMUNIZATION ADMIN: CPT

## 2022-03-16 ENCOUNTER — RA CDI HCC (OUTPATIENT)
Dept: OTHER | Facility: HOSPITAL | Age: 77
End: 2022-03-16

## 2022-03-16 NOTE — PROGRESS NOTES
[facility]  coding opportunities       Chart reviewed, no opportunity found:   [address]        Patients Insurance     Medicare Insurance: [employer] Advantage

## 2022-04-06 DIAGNOSIS — E78.2 MIXED HYPERLIPIDEMIA: ICD-10-CM

## 2022-04-06 RX ORDER — ATORVASTATIN CALCIUM 10 MG/1
TABLET, FILM COATED ORAL
Qty: 90 TABLET | Refills: 0 | Status: SHIPPED | OUTPATIENT
Start: 2022-04-06

## 2022-04-21 ENCOUNTER — OFFICE VISIT (OUTPATIENT)
Dept: INTERNAL MEDICINE CLINIC | Facility: CLINIC | Age: 77
End: 2022-04-21
Payer: COMMERCIAL

## 2022-04-21 VITALS
OXYGEN SATURATION: 96 % | DIASTOLIC BLOOD PRESSURE: 82 MMHG | SYSTOLIC BLOOD PRESSURE: 140 MMHG | WEIGHT: 198 LBS | TEMPERATURE: 97.5 F | HEART RATE: 75 BPM | BODY MASS INDEX: 33.8 KG/M2 | RESPIRATION RATE: 16 BRPM | HEIGHT: 64 IN

## 2022-04-21 DIAGNOSIS — Z13.820 OSTEOPOROSIS SCREENING: ICD-10-CM

## 2022-04-21 DIAGNOSIS — R53.83 FATIGUE, UNSPECIFIED TYPE: ICD-10-CM

## 2022-04-21 DIAGNOSIS — S86.811D STRAIN OF RIGHT TIBIALIS ANTERIOR MUSCLE, SUBSEQUENT ENCOUNTER: ICD-10-CM

## 2022-04-21 DIAGNOSIS — M47.816 LUMBAR SPONDYLOSIS: ICD-10-CM

## 2022-04-21 DIAGNOSIS — E78.2 MIXED HYPERLIPIDEMIA: ICD-10-CM

## 2022-04-21 DIAGNOSIS — S86.812D STRAIN OF LEFT TIBIALIS ANTERIOR MUSCLE, SUBSEQUENT ENCOUNTER: ICD-10-CM

## 2022-04-21 DIAGNOSIS — R03.0 ELEVATED BLOOD PRESSURE READING WITHOUT DIAGNOSIS OF HYPERTENSION: ICD-10-CM

## 2022-04-21 DIAGNOSIS — Z00.00 MEDICARE ANNUAL WELLNESS VISIT, SUBSEQUENT: Primary | ICD-10-CM

## 2022-04-21 DIAGNOSIS — E66.01 OBESITY, MORBID (HCC): ICD-10-CM

## 2022-04-21 PROCEDURE — 1036F TOBACCO NON-USER: CPT | Performed by: INTERNAL MEDICINE

## 2022-04-21 PROCEDURE — 1170F FXNL STATUS ASSESSED: CPT | Performed by: INTERNAL MEDICINE

## 2022-04-21 PROCEDURE — 3288F FALL RISK ASSESSMENT DOCD: CPT | Performed by: INTERNAL MEDICINE

## 2022-04-21 PROCEDURE — 1160F RVW MEDS BY RX/DR IN RCRD: CPT | Performed by: INTERNAL MEDICINE

## 2022-04-21 PROCEDURE — 1125F AMNT PAIN NOTED PAIN PRSNT: CPT | Performed by: INTERNAL MEDICINE

## 2022-04-21 PROCEDURE — 3725F SCREEN DEPRESSION PERFORMED: CPT | Performed by: INTERNAL MEDICINE

## 2022-04-21 PROCEDURE — 99214 OFFICE O/P EST MOD 30 MIN: CPT | Performed by: INTERNAL MEDICINE

## 2022-04-21 PROCEDURE — 1090F PRES/ABSN URINE INCON ASSESS: CPT | Performed by: INTERNAL MEDICINE

## 2022-04-21 PROCEDURE — G0439 PPPS, SUBSEQ VISIT: HCPCS | Performed by: INTERNAL MEDICINE

## 2022-04-21 RX ORDER — CELECOXIB 50 MG/1
50 CAPSULE ORAL 2 TIMES DAILY PRN
Qty: 60 CAPSULE | Refills: 2
Start: 2022-04-21 | End: 2022-06-17 | Stop reason: SDUPTHER

## 2022-04-21 NOTE — PROGRESS NOTES
Assessment and Plan:     Problem List Items Addressed This Visit        Musculoskeletal and Integument    Lumbar spondylosis     She takes gabapentin PRN            Other    Mixed hyperlipidemia    Relevant Orders    Comprehensive metabolic panel    Lipid Panel with Direct LDL reflex    TSH, 3rd generation with Free T4 reflex    Obesity, morbid (HCC)     BMI Counseling: Body mass index is 34 2 kg/m²  The BMI is above normal  Nutrition recommendations include consuming healthier snacks  Elevated blood pressure reading without diagnosis of hypertension     Low salt diet  Advised to start checking BP and call if >140/90 on average           Other Visit Diagnoses     Medicare annual wellness visit, subsequent    -  Primary    Strain of left tibialis anterior muscle, subsequent encounter        Relevant Medications    celecoxib (CeleBREX) 50 MG capsule    Strain of right tibialis anterior muscle, subsequent encounter        Relevant Medications    celecoxib (CeleBREX) 50 MG capsule    Fatigue, unspecified type        Relevant Orders    CBC and differential    Comprehensive metabolic panel    TSH, 3rd generation with Free T4 reflex    Vitamin D 25 hydroxy    Vitamin B12    Osteoporosis screening        Relevant Orders    DXA bone density spine hip and pelvis          Depression Screening and Follow-up Plan: Patient was screened for depression during today's encounter  They screened negative with a PHQ-2 score of 0  Preventive health issues were discussed with patient, and age appropriate screening tests were ordered as noted in patient's After Visit Summary  Personalized health advice and appropriate referrals for health education or preventive services given if needed, as noted in patient's After Visit Summary       History of Present Illness:     Patient presents for Medicare Annual Wellness visit    Patient Care Team:  Bijan Robles MD as PCP - Bo Mercedes Dr, [place]  Daylin Hadley MD (Vascular Surgery)     Problem List:     Patient Active Problem List   Diagnosis    [facility] )    History of breast cancer    Pulmonary nodule    Lumbar spondylosis    Spinal stenosis, lumbar region, with neurogenic claudication    Symptomatic varicose veins of both lower extremities    H/O right mastectomy    Mixed hyperlipidemia    Anxiety    Obesity, morbid ([facility] )    Elevated blood pressure reading without diagnosis of hypertension      Past Medical and Surgical History:     Past Medical History:   Diagnosis Date    Breast cancer ([facility] )     B/L    Cancer ([facility] )     Chronic low back pain     Endometrial cancer ([facility] )     PONV (postoperative nausea and vomiting)     Uterine cancer ([facility] ) 11/4/2012     Past Surgical History:   Procedure Laterality Date    BREAST LUMPECTOMY Left     BREAST RECONSTRUCTION Right 2010    COLONOSCOPY      JOINT REPLACEMENT Bilateral     KNEE ARTHROSCOPY Bilateral     MASTECTOMY Right     2003    ORTHOPEDIC SURGERY Bilateral     knee arthoscopy    VT LAP,CHOLECYSTECTOMY N/A 2/15/2019    Procedure: CHOLECYSTECTOMY LAPAROSCOPIC;  Surgeon: Tato Person MD;  [facility];  Service: General    RADICAL ABDOMINAL HYSTERECTOMY      REDUCTION MAMMAPLASTY Left       Family History:     Family History   Problem Relation Age of Onset   Elton Pila Stroke Mother     Hypertension Mother     Cancer Father       Social History:     Social History     Socioeconomic History    Marital status:       Spouse name: None    Number of children: None    Years of education: None    Highest education level: None   Occupational History    Occupation: retired   Tobacco Use    Smoking status: Never Smoker    Smokeless tobacco: Never Used   Vaping Use    Vaping Use: Never used   Substance and Sexual Activity    Alcohol use: No    Drug use: No    Sexual activity: Never   Other Topics Concern    None   Social History Narrative    Lives with adult daughter     Social Determinants of Health     Financial Resource Strain: Not on file   Food Insecurity: Not on file   Transportation Needs: Not on file   Physical Activity: Not on file   Stress: Not on file   Social Connections: Not on file   Intimate Partner Violence: Not on file   Housing Stability: Not on file      Medications and Allergies:     Current Outpatient Medications   Medication Sig Dispense Refill    ALPRAZolam (XANAX) 0 25 mg tablet Take 1 tablet (0 25 mg total) by mouth daily as needed for anxiety 30 tablet 0    atorvastatin (LIPITOR) 10 mg tablet take 1 tablet by mouth once daily 90 tablet 0    Calcium Carbonate-Vitamin D (CALTRATE 600+D) 600-400 MG-UNIT per tablet Take 1 tablet by mouth 2 (two) times a day       celecoxib (CeleBREX) 50 MG capsule Take 1 capsule (50 mg total) by mouth 2 (two) times a day as needed for mild pain 60 capsule 2    Diclofenac Sodium 3 % GEL Apply 2 g topically 2 (two) times a day as needed (pain) GI intolerance with ASA Unable to take nsaids due to age 100 g 1    gabapentin (NEURONTIN) 300 mg capsule Take 1 capsule (300 mg total) by mouth 2 (two) times a day 180 capsule 1     No current facility-administered medications for this visit  Allergies   Allergen Reactions    Latex Itching and Rash    Penicillin V Rash    Other Other (See Comments)     rash    Aspirin GI Intolerance    Oxycodone-Acetaminophen GI Intolerance and Headache      Immunizations:     Immunization History   Administered Date(s) Administered    COVID-19 PFIZER VACCINE 0 3 ML IM 02/19/2021, 03/12/2021, 08/30/2021    Influenza Split High Dose Preservative Free IM 11/01/2014    Influenza, Seasonal Vaccine, Quadrivalent, Adjuvanted,   5e 10/30/2020    Influenza, high dose seasonal 0 7 mL 02/10/2020, 12/28/2021    Pneumococcal Conjugate 13-Valent 07/15/2019    Pneumococcal Polysaccharide PPV23 11/01/2014      Health Maintenance:         Topic Date Due    Hepatitis C Screening  Never done    Colorectal Cancer Screening  02/07/2023     There are no preventive care reminders to display for this patient  Medicare Health Risk Assessment:     /82   Pulse 75   Temp 97 5 °F (36 4 °C)   Resp 16   Ht 5' 3 8" (1 621 m)   Wt 89 8 kg (198 lb)   SpO2 96%   BMI 34 20 kg/m²      Jacob is here for her Subsequent Wellness visit  Health Risk Assessment:   Patient rates overall health as good  Patient feels that their physical health rating is same  Patient is satisfied with their life  Eyesight was rated as same  Hearing was rated as same  Patient feels that their emotional and mental health rating is same  Patients states they are sometimes angry  Patient states they are sometimes unusually tired/fatigued  Pain experienced in the last 7 days has been some  Patient's pain rating has been 6/10  Patient states that she has experienced no weight loss or gain in last 6 months  Depression Screening:   PHQ-2 Score: 0      Fall Risk Screening: In the past year, patient has experienced: no history of falling in past year      Urinary Incontinence Screening:   Patient has not leaked urine accidently in the last six months  Home Safety:  Patient has trouble with stairs inside or outside of their home  Patient has working smoke alarms and has working carbon monoxide detector  Home safety hazards include: none  Nutrition:   Current diet is Low Cholesterol, Low Carb, No Added Salt and Limited junk food  Medications:   Patient is currently taking over-the-counter supplements  OTC medications include: Vitamin D3  Patient is able to manage medications  Activities of Daily Living (ADLs)/Instrumental Activities of Daily Living (IADLs):   Walk and transfer into and out of bed and chair?: Yes  Dress and groom yourself?: Yes    Bathe or shower yourself?: Yes    Feed yourself?  Yes  Do your laundry/housekeeping?: No  Manage your money, pay your bills and track your expenses?: Yes  Make your own meals?: Yes    Do your own shopping?: Yes    Durable Medical Equipment Suppliers  n/a    Previous Hospitalizations:   Any hospitalizations or ED visits within the last 12 months?: No      Advance Care Planning:   Living will: Yes    Durable POA for healthcare: Yes    Advanced directive: Yes      Cognitive Screening:   Provider or family/friend/caregiver concerned regarding cognition?: No    PREVENTIVE SCREENINGS      Cardiovascular Screening:    General: Screening Not Indicated and History Lipid Disorder      Diabetes Screening:     General: Screening Current      Colorectal Cancer Screening:     General: Screening Current      Breast Cancer Screening:     General: History Breast Cancer and Screening Current      Cervical Cancer Screening:    General: Screening Not Indicated      Osteoporosis Screening:    General: Risks and Benefits Discussed    Due for: DXA Axial      Abdominal Aortic Aneurysm (AAA) Screening:        General: Screening Not Indicated      Lung Cancer Screening:     General: Screening Not Indicated      Hepatitis C Screening:    General: Screening Not Indicated    Screening, Brief Intervention, and Referral to Treatment (SBIRT)    Screening  Typical number of drinks in a day: 0  Typical number of drinks in a week: 0  Interpretation: Low risk drinking behavior      AUDIT-C Screenin) How often did you have a drink containing alcohol in the past year? never  2) How many drinks did you have on a typical day when you were drinking in the past year? 0  3) How often did you have 6 or more drinks on one occasion in the past year? never    AUDIT-C Score: 0  Interpretation: Score 0-2 (female): Negative screen for alcohol misuse    Single Item Drug Screening:  How often have you used an illegal drug (including marijuana) or a prescription medication for non-medical reasons in the past year? never    Single Item Drug Screen Score: 0  Interpretation: Negative screen for possible drug use disorder    Review of Systems   Constitutional: Positive for fatigue (1month)  Negative for chills, fever and unexpected weight change  HENT: Negative for rhinorrhea  Respiratory: Negative for cough and shortness of breath  Cardiovascular: Negative for chest pain, palpitations and leg swelling  Gastrointestinal: Negative for abdominal pain, blood in stool, constipation, diarrhea, nausea and vomiting  Genitourinary: Negative for difficulty urinating, hematuria and vaginal bleeding  Musculoskeletal: Positive for myalgias (calf pain that comes and goes right >left and takes celecoxib PRN)  Neurological: Negative for dizziness and headaches  Physical Exam  Constitutional:       General: She is not in acute distress  Appearance: She is well-developed  She is obese  She is not ill-appearing, toxic-appearing or diaphoretic  HENT:      Head: Normocephalic and atraumatic  Eyes:      Conjunctiva/sclera: Conjunctivae normal    Cardiovascular:      Rate and Rhythm: Normal rate and regular rhythm  Heart sounds: Normal heart sounds  No murmur heard  Pulmonary:      Effort: Pulmonary effort is normal  No respiratory distress  Breath sounds: Normal breath sounds  No stridor  No wheezing or rales  Abdominal:      General: There is no distension  Palpations: Abdomen is soft  There is no mass  Tenderness: There is no abdominal tenderness  There is no guarding or rebound  Musculoskeletal:      Cervical back: Neck supple  Right lower leg: No edema  Left lower leg: No edema  Skin:     General: Skin is warm and dry  Neurological:      Mental Status: She is alert and oriented to person, place, and time  Psychiatric:         Mood and Affect: Mood normal          Behavior: Behavior normal          Thought Content:  Thought content normal          Judgment: Judgment normal          Charles Ann MD

## 2022-04-21 NOTE — PATIENT INSTRUCTIONS
Medicare Preventive Visit Patient Instructions  Thank you for completing your Welcome to Medicare Visit or Medicare Annual Wellness Visit today  Your next wellness visit will be due in one year (4/22/2023)  The screening/preventive services that you may require over the next 5-10 years are detailed below  Some tests may not apply to you based off risk factors and/or age  Screening tests ordered at today's visit but not completed yet may show as past due  Also, please note that scanned in results may not display below  Preventive Screenings:  Service Recommendations Previous Testing/Comments   Colorectal Cancer Screening  * Colonoscopy    * Fecal Occult Blood Test (FOBT)/Fecal Immunochemical Test (FIT)  * Fecal DNA/Cologuard Test  * Flexible Sigmoidoscopy Age: 54-65 years old   Colonoscopy: every 10 years (may be performed more frequently if at higher risk)  OR  FOBT/FIT: every 1 year  OR  Cologuard: every 3 years  OR  Sigmoidoscopy: every 5 years  Screening may be recommended earlier than age 48 if at higher risk for colorectal cancer  Also, an individualized decision between you and your healthcare provider will decide whether screening between the ages of 74-80 would be appropriate  Colonoscopy: 02/07/2018  FOBT/FIT: Not on file  Cologuard: Not on file  Sigmoidoscopy: Not on file          Breast Cancer Screening Age: 36 years old  Frequency: every 1-2 years  Not required if history of left and right mastectomy Mammogram: 04/03/2019        Cervical Cancer Screening Between the ages of 21-29, pap smear recommended once every 3 years  Between the ages of 33-67, can perform pap smear with HPV co-testing every 5 years     Recommendations may differ for women with a history of total hysterectomy, cervical cancer, or abnormal pap smears in past  Pap Smear: Not on file        Hepatitis C Screening Once for adults [place] [facility]  More frequently in patients at high risk for Hepatitis C Hep C Antibody: Not on file        Diabetes Screening 1-2 times per year if you're at risk for diabetes or have pre-diabetes Fasting glucose: 97 mg/dL   A1C: No results in last 5 years        Cholesterol Screening Once every 5 years if you don't have a lipid disorder  May order more often based on risk factors  Lipid panel: 12/27/2021          Other Preventive Screenings Covered by Medicare:  1  Abdominal Aortic Aneurysm (AAA) Screening: covered once if your at risk  You're considered to be at risk if you have a family history of AAA  2  Lung Cancer Screening: covers low dose CT scan once per year if you meet all of the following conditions: (1) Age 50-69; (2) No signs or symptoms of lung cancer; (3) Current smoker or have quit smoking within the last 15 years; (4) You have a tobacco smoking history of at least 30 pack years (packs per day multiplied by number of years you smoked); (5) You get a written order from a healthcare provider  3  Glaucoma Screening: covered annually if you're considered high risk: (1) You have diabetes OR (2) Family history of glaucoma OR (3)  aged 48 and older OR (3)  American aged 72 and older  3  Osteoporosis Screening: covered every 2 years if you meet one of the following conditions: (1) You're estrogen deficient and at risk for osteoporosis based off medical history and other findings; (2) Have a vertebral abnormality; (3) On glucocorticoid therapy for more than 3 months; (4) Have primary hyperparathyroidism; (5) On osteoporosis medications and need to assess response to drug therapy  · Last bone density test (DXA Scan): Not on file  5  HIV Screening: covered annually if you're between the age of 12-76  Also covered annually if you are younger than 13 and older than 72 with risk factors for HIV infection  For pregnant patients, it is covered up to 3 times per pregnancy      Immunizations:  Immunization Recommendations   Influenza Vaccine Annual influenza vaccination during flu season is recommended for all persons aged >= 6 months who do not have contraindications   Pneumococcal Vaccine (Prevnar and Pneumovax)  * Prevnar = PCV13  * Pneumovax = PPSV23   Adults 25-60 years old: 1-3 doses may be recommended based on certain risk factors  Adults 72 years old: Prevnar (PCV13) vaccine recommended followed by Pneumovax (PPSV23) vaccine  If already received PPSV23 since turning 65, then PCV13 recommended at least one year after PPSV23 dose  Hepatitis B Vaccine 3 dose series if at intermediate or high risk (ex: diabetes, end stage renal disease, liver disease)   Tetanus (Td) Vaccine - COST NOT COVERED BY MEDICARE PART B Following completion of primary series, a booster dose should be given every 10 years to maintain immunity against tetanus  Td may also be given as tetanus wound prophylaxis  Tdap Vaccine - COST NOT COVERED BY MEDICARE PART B Recommended at least once for all adults  For pregnant patients, recommended with each pregnancy  Shingles Vaccine (Shingrix) - COST NOT COVERED BY MEDICARE PART B  2 shot series recommended in those aged 48 and above     Health Maintenance Due:      Topic Date Due    Hepatitis C Screening  Never done    Colorectal Cancer Screening  02/07/2023     Immunizations Due:  There are no preventive care reminders to display for this patient  Advance Directives   What are advance directives? Advance directives are legal documents that state your wishes and plans for medical care  These plans are made ahead of time in case you lose your ability to make decisions for yourself  Advance directives can apply to any medical decision, such as the treatments you want, and if you want to donate organs  What are the types of advance directives? There are many types of advance directives, and each state has rules about how to use them  You may choose a combination of any of the following:  · Living will: This is a written record of the treatment you want   You can also choose which treatments you do not want, which to limit, and which to stop at a certain time  This includes surgery, medicine, IV fluid, and tube feedings  · Durable power of  for healthcare [facility]): This is a written record that states who you want to make healthcare choices for you when you are unable to make them for yourself  This person, called a proxy, is usually a family member or a friend  You may choose more than 1 proxy  · Do not resuscitate (DNR) order:  A DNR order is used in case your heart stops beating or you stop breathing  It is a request not to have certain forms of treatment, such as CPR  A DNR order may be included in other types of advance directives  · Medical directive: This covers the care that you want if you are in a coma, near death, or unable to make decisions for yourself  You can list the treatments you want for each condition  Treatment may include pain medicine, surgery, blood transfusions, dialysis, IV or tube feedings, and a ventilator (breathing machine)  · Values history: This document has questions about your views, beliefs, and how you feel and think about life  This information can help others choose the care that you would choose  Why are advance directives important? An advance directive helps you control your care  Although spoken wishes may be used, it is better to have your wishes written down  Spoken wishes can be misunderstood, or not followed  Treatments may be given even if you do not want them  An advance directive may make it easier for your family to make difficult choices about your care  Weight Management   Why it is important to manage your weight:  Being overweight increases your risk of health conditions such as heart disease, high blood pressure, type 2 diabetes, and certain types of cancer  It can also increase your risk for osteoarthritis, sleep apnea, and other respiratory problems  Aim for a slow, steady weight loss   Even a small amount of weight loss can lower your risk of health problems  How to lose weight safely:  A safe and healthy way to lose weight is to eat fewer calories and get regular exercise  You can lose up about 1 pound a week by decreasing the number of calories you eat by 500 calories each day  Healthy meal plan for weight management:  A healthy meal plan includes a variety of foods, contains fewer calories, and helps you stay healthy  A healthy meal plan includes the following:  · Eat whole-grain foods more often  A healthy meal plan should contain fiber  Fiber is the part of grains, fruits, and vegetables that is not broken down by your body  Whole-grain foods are healthy and provide extra fiber in your diet  Some examples of whole-grain foods are whole-wheat breads and pastas, oatmeal, brown rice, and bulgur  · Eat a variety of vegetables every day  Include dark, leafy greens such as spinach, kale, belkys greens, and mustard greens  Eat yellow and orange vegetables such as carrots, sweet potatoes, and winter squash  · Eat a variety of fruits every day  Choose fresh or canned fruit (canned in its own juice or light syrup) instead of juice  Fruit juice has very little or no fiber  · Eat low-fat dairy foods  Drink fat-free (skim) milk or 1% milk  Eat fat-free yogurt and low-fat cottage cheese  Try low-fat cheeses such as mozzarella and other reduced-fat cheeses  · Choose meat and other protein foods that are low in fat  Choose beans or other legumes such as split peas or lentils  Choose fish, skinless poultry (chicken or turkey), or lean cuts of red meat (beef or pork)  Before you cook meat or poultry, cut off any visible fat  · Use less fat and oil  Try baking foods instead of frying them  Add less fat, such as margarine, sour cream, regular salad dressing and mayonnaise to foods  Eat fewer high-fat foods  Some examples of high-fat foods include french fries, doughnuts, ice cream, and cakes    · Eat fewer sweets  Limit foods and drinks that are high in sugar  This includes candy, cookies, regular soda, and sweetened drinks  Exercise:  Exercise at least 30 minutes per day on most days of the week  Some examples of exercise include walking, biking, dancing, and swimming  You can also fit in more physical activity by taking the stairs instead of the elevator or parking farther away from stores  Ask your healthcare provider about the best exercise plan for you  © Copyright [employer] 2018 Information is for End User's use only and may not be sold, redistributed or otherwise used for commercial purposes   All illustrations and images included in CareNotes® are the copyrighted property of A D A M , Inc  or [address] [place] [address]

## 2022-04-21 NOTE — ASSESSMENT & PLAN NOTE
BMI Counseling: Body mass index is 34 2 kg/m²  The BMI is above normal  Nutrition recommendations include consuming healthier snacks

## 2022-06-09 ENCOUNTER — TELEPHONE (OUTPATIENT)
Dept: INTERNAL MEDICINE CLINIC | Facility: CLINIC | Age: 77
End: 2022-06-09

## 2022-06-09 NOTE — TELEPHONE ENCOUNTER
Patient had mammography and biopsy done and on Monday she went to see Dr Lorena Melendez and patient was informed of DX: breast cancer stage 1, left  Treatment was discussed and patient agreed to mastectomy and breast reconstruction  Patient asked if you needed to see or speak to her prior to this procedure  Patient not sure if preop needed there is no date for procedure yet

## 2022-06-17 ENCOUNTER — OFFICE VISIT (OUTPATIENT)
Dept: INTERNAL MEDICINE CLINIC | Facility: CLINIC | Age: 77
End: 2022-06-17
Payer: COMMERCIAL

## 2022-06-17 VITALS
RESPIRATION RATE: 16 BRPM | HEIGHT: 64 IN | TEMPERATURE: 97.9 F | DIASTOLIC BLOOD PRESSURE: 70 MMHG | OXYGEN SATURATION: 96 % | BODY MASS INDEX: 33.67 KG/M2 | HEART RATE: 70 BPM | SYSTOLIC BLOOD PRESSURE: 136 MMHG | WEIGHT: 197.2 LBS

## 2022-06-17 DIAGNOSIS — S86.811D STRAIN OF RIGHT TIBIALIS ANTERIOR MUSCLE, SUBSEQUENT ENCOUNTER: ICD-10-CM

## 2022-06-17 DIAGNOSIS — S86.812D STRAIN OF LEFT TIBIALIS ANTERIOR MUSCLE, SUBSEQUENT ENCOUNTER: ICD-10-CM

## 2022-06-17 DIAGNOSIS — Z01.818 PREOPERATIVE CLEARANCE: Primary | ICD-10-CM

## 2022-06-17 DIAGNOSIS — F41.9 ANXIETY: ICD-10-CM

## 2022-06-17 PROCEDURE — 93000 ELECTROCARDIOGRAM COMPLETE: CPT | Performed by: NURSE PRACTITIONER

## 2022-06-17 PROCEDURE — 1160F RVW MEDS BY RX/DR IN RCRD: CPT | Performed by: NURSE PRACTITIONER

## 2022-06-17 PROCEDURE — 1036F TOBACCO NON-USER: CPT | Performed by: NURSE PRACTITIONER

## 2022-06-17 PROCEDURE — 99213 OFFICE O/P EST LOW 20 MIN: CPT | Performed by: NURSE PRACTITIONER

## 2022-06-17 RX ORDER — ALPRAZOLAM 0.25 MG/1
0.25 TABLET ORAL DAILY PRN
Qty: 30 TABLET | Refills: 0
Start: 2022-06-17 | End: 2022-06-17 | Stop reason: SDUPTHER

## 2022-06-17 RX ORDER — CELECOXIB 50 MG/1
50 CAPSULE ORAL 2 TIMES DAILY PRN
Qty: 60 CAPSULE | Refills: 5 | Status: SHIPPED | OUTPATIENT
Start: 2022-06-17

## 2022-06-17 RX ORDER — ALPRAZOLAM 0.25 MG/1
0.25 TABLET ORAL DAILY PRN
Qty: 30 TABLET | Refills: 0 | Status: SHIPPED | OUTPATIENT
Start: 2022-06-17

## 2022-06-17 NOTE — PROGRESS NOTES
Assessment/Plan:    Breast cancer, stage 1, left (HCC)  Cleared for breast surgery with Dr Rosado People  Refilled xanax       Diagnoses and all orders for this visit:    Preoperative clearance  -     POCT ECG    Strain of left tibialis anterior muscle, subsequent encounter  -     celecoxib (CeleBREX) 50 MG capsule; Take 1 capsule (50 mg total) by mouth 2 (two) times a day as needed for mild pain    Strain of right tibialis anterior muscle, subsequent encounter  -     celecoxib (CeleBREX) 50 MG capsule; Take 1 capsule (50 mg total) by mouth 2 (two) times a day as needed for mild pain    Anxiety  Comments:  [place] PDMP checked  Contract signed  Orders:  -     Discontinue: ALPRAZolam (XANAX) 0 25 mg tablet; Take 1 tablet (0 25 mg total) by mouth daily as needed for anxiety  -     ALPRAZolam (XANAX) 0 25 mg tablet; Take 1 tablet (0 25 mg total) by mouth daily as needed for anxiety          Subjective:      Patient ID: Dexter Crockett is a 65 y o  female  Patient is here for recurrent breast ca of the left breast, needs clearance for surgery with Dr Marco Antonio Hernandes at [facility]  She is not on a blood thinner, will stop celebrex a week before surgery  She will be going for bloodwork ordered by her surgeon      The following portions of the patient's history were reviewed and updated as appropriate: allergies, current medications, past family history, past medical history, past social history, past surgical history and problem list     Review of Systems   Constitutional: Negative  HENT: Negative  Eyes: Negative  Respiratory: Negative  Cardiovascular: Negative  Gastrointestinal: Negative  Musculoskeletal: Negative  Neurological: Negative  Objective:      /70   Pulse 70   Temp 97 9 °F (36 6 °C)   Resp 16   Ht 5' 3 8" (1 621 m)   Wt 89 4 kg (197 lb 3 2 oz)   SpO2 96%   BMI 34 06 kg/m²          Physical Exam  Vitals and nursing note reviewed     Constitutional:       Appearance: She is well-developed  HENT:      Head: Normocephalic and atraumatic  Right Ear: External ear normal       Left Ear: External ear normal       Nose: Nose normal    Eyes:      Conjunctiva/sclera: Conjunctivae normal       Pupils: Pupils are equal, round, and reactive to light  Cardiovascular:      Rate and Rhythm: Normal rate and regular rhythm  Pulmonary:      Effort: Pulmonary effort is normal       Breath sounds: Normal breath sounds  Abdominal:      General: Bowel sounds are normal       Palpations: Abdomen is soft  Musculoskeletal:         General: Normal range of motion  Cervical back: Normal range of motion and neck supple  Skin:     General: Skin is warm and dry  Neurological:      Mental Status: She is alert and oriented to person, place, and time

## 2022-06-18 DIAGNOSIS — Z96.653 PAIN OF BOTH LOWER EXTREMITIES DUE TO BILATERAL TOTAL KNEE REPLACEMENTS, INITIAL ENCOUNTER (HCC): ICD-10-CM

## 2022-06-18 DIAGNOSIS — G89.29 CHRONIC PAIN OF BOTH KNEES: ICD-10-CM

## 2022-06-18 DIAGNOSIS — M25.562 CHRONIC PAIN OF BOTH KNEES: ICD-10-CM

## 2022-06-18 DIAGNOSIS — Z96.653 HISTORY OF TOTAL KNEE REPLACEMENT, BILATERAL: ICD-10-CM

## 2022-06-18 DIAGNOSIS — M25.561 CHRONIC PAIN OF BOTH KNEES: ICD-10-CM

## 2022-06-18 DIAGNOSIS — T84.84XA PAIN OF BOTH LOWER EXTREMITIES DUE TO BILATERAL TOTAL KNEE REPLACEMENTS, INITIAL ENCOUNTER (HCC): ICD-10-CM

## 2022-06-20 RX ORDER — DICLOFENAC SODIUM 30 MG/G
2 GEL TOPICAL 2 TIMES DAILY PRN
Qty: 100 G | Refills: 0 | Status: SHIPPED | OUTPATIENT
Start: 2022-06-20

## 2022-07-08 ENCOUNTER — APPOINTMENT (OUTPATIENT)
Dept: LAB | Age: 77
End: 2022-07-08
Payer: COMMERCIAL

## 2022-07-08 DIAGNOSIS — R53.83 FATIGUE, UNSPECIFIED TYPE: ICD-10-CM

## 2022-07-08 LAB
25(OH)D3 SERPL-MCNC: 29.9 NG/ML (ref 30–100)
ALBUMIN SERPL BCP-MCNC: 3.7 G/DL (ref 3.5–5)
ALP SERPL-CCNC: 67 U/L (ref 46–116)
ALT SERPL W P-5'-P-CCNC: 16 U/L (ref 12–78)
ANION GAP SERPL CALCULATED.3IONS-SCNC: 6 MMOL/L (ref 4–13)
AST SERPL W P-5'-P-CCNC: 16 U/L (ref 5–45)
BASOPHILS # BLD AUTO: 0.06 THOUSANDS/ΜL (ref 0–0.1)
BASOPHILS NFR BLD AUTO: 1 % (ref 0–1)
BILIRUB SERPL-MCNC: 0.86 MG/DL (ref 0.2–1)
BUN SERPL-MCNC: 12 MG/DL (ref 5–25)
CALCIUM SERPL-MCNC: 9.8 MG/DL (ref 8.3–10.1)
CHLORIDE SERPL-SCNC: 108 MMOL/L (ref 100–108)
CHOLEST SERPL-MCNC: 213 MG/DL
CO2 SERPL-SCNC: 24 MMOL/L (ref 21–32)
CREAT SERPL-MCNC: 1.08 MG/DL (ref 0.6–1.3)
EOSINOPHIL # BLD AUTO: 0.21 THOUSAND/ΜL (ref 0–0.61)
EOSINOPHIL NFR BLD AUTO: 2 % (ref 0–6)
ERYTHROCYTE [DISTWIDTH] IN BLOOD BY AUTOMATED COUNT: 13.9 % (ref 11.6–15.1)
ERYTHROCYTE [SEDIMENTATION RATE] IN BLOOD: 22 MM/HOUR (ref 0–29)
GFR SERPL CREATININE-BSD FRML MDRD: 49 ML/MIN/1.73SQ M
GLUCOSE P FAST SERPL-MCNC: 98 MG/DL (ref 65–99)
HCT VFR BLD AUTO: 41.3 % (ref 34.8–46.1)
HDLC SERPL-MCNC: 49 MG/DL
HGB BLD-MCNC: 13.8 G/DL (ref 11.5–15.4)
IMM GRANULOCYTES # BLD AUTO: 0.03 THOUSAND/UL (ref 0–0.2)
IMM GRANULOCYTES NFR BLD AUTO: 0 % (ref 0–2)
LDLC SERPL CALC-MCNC: 129 MG/DL (ref 0–100)
LYMPHOCYTES # BLD AUTO: 2.98 THOUSANDS/ΜL (ref 0.6–4.47)
LYMPHOCYTES NFR BLD AUTO: 33 % (ref 14–44)
MCH RBC QN AUTO: 29.1 PG (ref 26.8–34.3)
MCHC RBC AUTO-ENTMCNC: 33.4 G/DL (ref 31.4–37.4)
MCV RBC AUTO: 87 FL (ref 82–98)
MONOCYTES # BLD AUTO: 0.88 THOUSAND/ΜL (ref 0.17–1.22)
MONOCYTES NFR BLD AUTO: 10 % (ref 4–12)
NEUTROPHILS # BLD AUTO: 4.91 THOUSANDS/ΜL (ref 1.85–7.62)
NEUTS SEG NFR BLD AUTO: 54 % (ref 43–75)
NRBC BLD AUTO-RTO: 0 /100 WBCS
PLATELET # BLD AUTO: 234 THOUSANDS/UL (ref 149–390)
PMV BLD AUTO: 11.5 FL (ref 8.9–12.7)
POTASSIUM SERPL-SCNC: 4.2 MMOL/L (ref 3.5–5.3)
PROT SERPL-MCNC: 7.7 G/DL (ref 6.4–8.2)
RBC # BLD AUTO: 4.75 MILLION/UL (ref 3.81–5.12)
SODIUM SERPL-SCNC: 138 MMOL/L (ref 136–145)
TRIGL SERPL-MCNC: 174 MG/DL
TSH SERPL DL<=0.05 MIU/L-ACNC: 3.09 UIU/ML (ref 0.45–4.5)
VIT B12 SERPL-MCNC: 910 PG/ML (ref 100–900)
WBC # BLD AUTO: 9.07 THOUSAND/UL (ref 4.31–10.16)

## 2022-07-08 PROCEDURE — 36415 COLL VENOUS BLD VENIPUNCTURE: CPT | Performed by: INTERNAL MEDICINE

## 2022-07-08 PROCEDURE — 80053 COMPREHEN METABOLIC PANEL: CPT | Performed by: INTERNAL MEDICINE

## 2022-07-08 PROCEDURE — 84443 ASSAY THYROID STIM HORMONE: CPT | Performed by: INTERNAL MEDICINE

## 2022-07-08 PROCEDURE — 80061 LIPID PANEL: CPT | Performed by: INTERNAL MEDICINE

## 2022-07-08 PROCEDURE — 82306 VITAMIN D 25 HYDROXY: CPT

## 2022-07-08 PROCEDURE — 85652 RBC SED RATE AUTOMATED: CPT | Performed by: ORTHOPAEDIC SURGERY

## 2022-07-08 PROCEDURE — 85025 COMPLETE CBC W/AUTO DIFF WBC: CPT | Performed by: INTERNAL MEDICINE

## 2022-07-08 PROCEDURE — 82607 VITAMIN B-12: CPT

## 2022-08-23 ENCOUNTER — OFFICE VISIT (OUTPATIENT)
Dept: INTERNAL MEDICINE CLINIC | Facility: CLINIC | Age: 77
End: 2022-08-23
Payer: COMMERCIAL

## 2022-08-23 VITALS
HEIGHT: 64 IN | OXYGEN SATURATION: 98 % | RESPIRATION RATE: 16 BRPM | DIASTOLIC BLOOD PRESSURE: 78 MMHG | BODY MASS INDEX: 32.95 KG/M2 | WEIGHT: 193 LBS | HEART RATE: 76 BPM | SYSTOLIC BLOOD PRESSURE: 128 MMHG

## 2022-08-23 DIAGNOSIS — D64.9 ANEMIA, UNSPECIFIED TYPE: ICD-10-CM

## 2022-08-23 DIAGNOSIS — F41.9 ANXIETY: ICD-10-CM

## 2022-08-23 DIAGNOSIS — C50.912 BREAST CANCER, STAGE 1, LEFT (HCC): Primary | ICD-10-CM

## 2022-08-23 DIAGNOSIS — Z90.12 S/P LEFT MASTECTOMY: ICD-10-CM

## 2022-08-23 PROCEDURE — 99214 OFFICE O/P EST MOD 30 MIN: CPT | Performed by: INTERNAL MEDICINE

## 2022-08-23 PROCEDURE — 1160F RVW MEDS BY RX/DR IN RCRD: CPT | Performed by: INTERNAL MEDICINE

## 2022-08-23 RX ORDER — FERROUS SULFATE TAB EC 324 MG (65 MG FE EQUIVALENT) 324 (65 FE) MG
324 TABLET DELAYED RESPONSE ORAL EVERY OTHER DAY
Qty: 30 TABLET | Refills: 2 | Status: SHIPPED | OUTPATIENT
Start: 2022-08-23

## 2022-08-23 RX ORDER — OXYCODONE HYDROCHLORIDE AND ACETAMINOPHEN 5; 325 MG/1; MG/1
1 TABLET ORAL
COMMUNITY
Start: 2022-08-15 | End: 2022-08-23

## 2022-08-23 RX ORDER — TRAMADOL HYDROCHLORIDE 50 MG/1
TABLET ORAL
COMMUNITY
Start: 2022-07-21

## 2022-08-23 RX ORDER — OXYCODONE HYDROCHLORIDE AND ACETAMINOPHEN 5; 325 MG/1; MG/1
1 TABLET ORAL
Qty: 30 TABLET | Refills: 0 | Status: SHIPPED | OUTPATIENT
Start: 2022-08-23

## 2022-08-23 RX ORDER — ALPRAZOLAM 0.25 MG/1
0.25 TABLET ORAL DAILY PRN
Qty: 30 TABLET | Refills: 2 | Status: SHIPPED | OUTPATIENT
Start: 2022-08-23

## 2022-08-23 NOTE — PROGRESS NOTES
Assessment/Plan:  Wound appears clean  She will continue to monitor and see her plastic surgeon in 2 weeks  Discussed starting oral iron every other day  Limited use of Percocet and alprazolam       Problem List Items Addressed This Visit        Other    Breast cancer, stage 1, left ([facility] [address] ) - Primary    Anxiety    Relevant Medications    ALPRAZolam (XANAX) 0 25 mg tablet      Other Visit Diagnoses     S/P left mastectomy        Relevant Medications    oxyCODONE-acetaminophen (PERCOCET) 5-325 mg per tablet    Anemia, unspecified type        Relevant Medications    ferrous sulfate 324 (65 Fe) mg            Subjective:      Patient ID: Anu Butterfield is a 65 y o  female  HPI  Underwent left mastectomy with breast reconstruction latissimus dorsi flap  7/20 at [facility]  Reports copious drainage from incisions and drains  Later developed an eschar on the inner breast which was debrided yesterday  The wound has minimal drainage  Her daughters are very supportive and were present for a few weeks post op  Washing with Hibiclens, peroxide, lets it air dry and covers with clean gauze  She has been needing to take the alprazolam for sleep  She sometimes takes the Percocet for the pain  H/o right mastectomy, left breast lumpectomy, radiation  Feels tired from months of poor sleep, poor appetite managing the wound  Trying to stay positive, stay outside the house    The following portions of the patient's history were reviewed and updated as appropriate: allergies, current medications, past family history, past medical history, past social history, past surgical history and problem list     Review of Systems   Constitutional: Positive for fatigue and unexpected weight change (weight loss)  Negative for fever  Respiratory: Negative for shortness of breath  Cardiovascular: Negative for chest pain and palpitations  Gastrointestinal: Negative for abdominal pain, constipation and diarrhea     Genitourinary: Negative for difficulty urinating  Skin: Positive for wound  Neurological: Negative for dizziness and headaches  Psychiatric/Behavioral: Positive for sleep disturbance  Objective:      /78   Pulse 76   Resp 16   Ht 5' 3 8" (1 621 m)   Wt 87 5 kg (193 lb)   SpO2 98%   BMI 33 34 kg/m²          Physical Exam  Constitutional:       General: She is not in acute distress  Appearance: She is well-developed  She is obese  She is not ill-appearing, toxic-appearing or diaphoretic  HENT:      Head: Normocephalic and atraumatic  Eyes:      Conjunctiva/sclera: Conjunctivae normal    Cardiovascular:      Rate and Rhythm: Normal rate and regular rhythm  Heart sounds: Normal heart sounds  No murmur heard  Pulmonary:      Effort: Pulmonary effort is normal  No respiratory distress  Breath sounds: Normal breath sounds  No wheezing or rales  Abdominal:      General: There is no distension  Palpations: Abdomen is soft  There is no mass  Tenderness: There is no abdominal tenderness  There is no guarding or rebound  Musculoskeletal:      Cervical back: Neck supple  Right lower leg: No edema  Left lower leg: No edema  Skin:     Comments: Large fist size open wound on the left inner breast without drainage, granulation tissue forming on the edges and surrounding skin appears clean, no erythema  Incision on the chest extending to the back appears clean and dry also   Neurological:      Mental Status: She is alert and oriented to person, place, and time  Psychiatric:         Mood and Affect: Mood normal          Behavior: Behavior normal          Thought Content:  Thought content normal          Judgment: Judgment normal

## 2022-11-02 ENCOUNTER — TELEPHONE (OUTPATIENT)
Dept: INTERNAL MEDICINE CLINIC | Facility: CLINIC | Age: 77
End: 2022-11-02

## 2022-11-02 NOTE — TELEPHONE ENCOUNTER
Patient cancelled her appointment  She is having surgery again on her breast   It is not healing and she will call after the surgery  Her surgery is 11/23   This is a FYI

## 2022-11-29 ENCOUNTER — APPOINTMENT (OUTPATIENT)
Dept: LAB | Age: 77
End: 2022-11-29

## 2022-11-29 DIAGNOSIS — Z01.812 PRE-OPERATIVE LABORATORY EXAMINATION: ICD-10-CM

## 2022-11-29 DIAGNOSIS — L59.9 DISORDER OF THE SKIN, SUBCU RELATED TO RADIATION, UNSP: ICD-10-CM

## 2022-11-29 LAB
BUN SERPL-MCNC: 14 MG/DL (ref 5–25)
CREAT SERPL-MCNC: 1.08 MG/DL (ref 0.6–1.3)
GFR SERPL CREATININE-BSD FRML MDRD: 49 ML/MIN/1.73SQ M

## 2022-12-02 NOTE — NURSING NOTE
Telephone call to review upcoming procedure prep  No answer  Message left informing pt she must be NPO 3 hours prior to procedure and NOT to drink any barium or test would not be able to be performed  RN requested a return phone call to make sure instructions were understood and to review allergies  Awaiting return phone call

## 2022-12-04 ENCOUNTER — HOSPITAL ENCOUNTER (OUTPATIENT)
Dept: CT IMAGING | Facility: HOSPITAL | Age: 77
Discharge: HOME | End: 2022-12-04

## 2022-12-04 DIAGNOSIS — L98.9: ICD-10-CM

## 2022-12-04 RX ADMIN — IOHEXOL 140 ML: 350 INJECTION, SOLUTION INTRAVENOUS at 09:04

## 2022-12-10 ENCOUNTER — RA CDI HCC (OUTPATIENT)
Dept: OTHER | Facility: HOSPITAL | Age: 77
End: 2022-12-10

## 2022-12-10 NOTE — PROGRESS NOTES
[facility]  coding opportunities       Chart reviewed, no opportunity found:   [address]        Patients Insurance     Medicare Insurance: [employer] Advantage

## 2022-12-12 ENCOUNTER — OFFICE VISIT (OUTPATIENT)
Dept: INTERNAL MEDICINE CLINIC | Facility: CLINIC | Age: 77
End: 2022-12-12

## 2022-12-12 VITALS
HEIGHT: 64 IN | WEIGHT: 194.2 LBS | DIASTOLIC BLOOD PRESSURE: 74 MMHG | HEART RATE: 66 BPM | BODY MASS INDEX: 33.16 KG/M2 | RESPIRATION RATE: 16 BRPM | OXYGEN SATURATION: 95 % | SYSTOLIC BLOOD PRESSURE: 122 MMHG

## 2022-12-12 DIAGNOSIS — C50.912 BREAST CANCER, STAGE 1, LEFT (HCC): ICD-10-CM

## 2022-12-12 DIAGNOSIS — I72.2 RENAL ARTERY ANEURYSM (HCC): ICD-10-CM

## 2022-12-12 DIAGNOSIS — K64.3 GRADE IV HEMORRHOIDS: Primary | ICD-10-CM

## 2022-12-12 NOTE — PATIENT INSTRUCTIONS
Sitz Bath   WHAT YOU NEED TO KNOW:   A sitz bath is when you soak in a warm or hot water tub to promote wound healing  It is often done after surgeries on the rectal or genital area  The heat from the water will clean the area, improve blood flow for healing, and decrease swelling and pain  DISCHARGE INSTRUCTIONS:   Gather your sitz bath supplies:   A bathtub thermometer to check the water temperature    Towels to cover your upper body during the bath and to dry off after    A footstool to help you enter the bathtub if needed    Bath mats to prevent slips in the tub and after you get out of the tub    Clean clothes to wear after the bath    Fill the bathtub with water:  Fill the bathtub with water until it is at about the level of your belly button  Check the temperature of the water before you get in  For a warm water sitz bath, the water should be 94° to 98° Fahrenheit  A hot water sitz bath should be between 105° to 110° Fahrenheit  Stay in the bath for about 15 to 20 minutes  Portable sitz bath: You may be sent home with a portable sitz bath if you cannot get in and out of the bathtub  This is a small tub that will fit under your toilet seat  You will fill the tub with water as directed once it is under the toilet seat  Check the temperature of the water  You will also have a squirt bottle or water bag filled with the warm water  These are used to let the water flow out over the wound area during the sitz bath  This is also done for 15 to 20 minutes  Important tips when taking a sitz bath: You may have some discomfort when at first when you sit in the warm water  This should go away shortly after entering the tub  Check the water temperature a few times during the bath  You may need to add more water to keep it at the right temperature  Take a sitz bath when someone is close by to help you if needed  The heat from the water may cause you to feel weak or lightheaded   If this happens, call for help to get out of the tub  Do not stand up on your own in case you lose your balance  © Copyright [employer] 2022 Information is for End User's use only and may not be sold, redistributed or otherwise used for commercial purposes  All illustrations and images included in CareNotes® are the copyrighted property of [employer]  or Brayan Ventura  The above information is an  only  It is not intended as medical advice for individual conditions or treatments  Talk to your doctor, nurse or pharmacist before following any medical regimen to see if it is safe and effective for you

## 2022-12-12 NOTE — PROGRESS NOTES
Assessment/Plan:    Grade IV hemorrhoids  Sitz baths  Try Rectiv ointment  F/U with Dr Eduar Marks    Renal artery aneurysm [facility])  6mm renal hilum saccular aneurysm seen on recent CTA  1 year repeat CT recommended    Breast cancer, stage 1, left ([address] )  Recurrent on the left s/p mastectomy with reconstruction in July  There is a wound on the left breast and she has seen 2 surgeons for this  It is clean, no signs of infection  She will f/u with both and make a decision on strategy for mammoplasty         Problem List Items Addressed This Visit        Digestive    Grade IV hemorrhoids - Primary     Sitz baths  Try Rectiv ointment  F/U with Dr Eduar Marks         Relevant Medications    nitroglycerin (RECTIV) 0 4 %    Other Relevant Orders    Ambulatory Referral to General Surgery       Cardiovascular and Mediastinum    Renal artery aneurysm (HCC)     6mm renal hilum saccular aneurysm seen on recent CTA  1 year repeat CT recommended            Other    Breast cancer, stage 1, left (HCC)     Recurrent on the left s/p mastectomy with reconstruction in July  There is a wound on the left breast and she has seen 2 surgeons for this  It is clean, no signs of infection  She will f/u with both and make a decision on strategy for mammoplasty              Subjective:      Patient ID: Pavel Erickson is a 65 y o  female  HPI  A week of bleeding hemorrhoids associated with mild pain  No relief from OTC topical  treatment  BMs are regular    She has seen 2 surgeons for left breast mammoplasty    The following portions of the patient's history were reviewed and updated as appropriate: allergies, current medications, past family history, past medical history, past social history, past surgical history and problem list     Review of Systems   Constitutional: Negative for chills and fever  Gastrointestinal: Positive for anal bleeding  Negative for abdominal pain, diarrhea and nausea           Objective:      /74   Pulse 66 Resp 16   Ht 5' 3 8" (1 621 m)   Wt 88 1 kg (194 lb 3 2 oz)   SpO2 95%   BMI 33 54 kg/m²          Physical Exam  Constitutional:       General: She is not in acute distress  Appearance: She is not ill-appearing, toxic-appearing or diaphoretic  Cardiovascular:      Rate and Rhythm: Regular rhythm  Heart sounds: Normal heart sounds  Pulmonary:      Effort: No respiratory distress  Breath sounds: Normal breath sounds  Abdominal:      Palpations: Abdomen is soft  Tenderness: There is no abdominal tenderness  Genitourinary:     Exam position: Knee-chest position  Rectum: External hemorrhoid (MARRY not performed) present         Left breast wound is clean, no drainage, erythema

## 2022-12-17 PROBLEM — I72.2 RENAL ARTERY ANEURYSM (HCC): Status: ACTIVE | Noted: 2022-12-17

## 2022-12-17 PROBLEM — K64.3 GRADE IV HEMORRHOIDS: Status: ACTIVE | Noted: 2022-12-17

## 2022-12-17 NOTE — ASSESSMENT & PLAN NOTE
Recurrent on the left s/p mastectomy with reconstruction in July  There is a wound on the left breast and she has seen 2 surgeons for this   It is clean, no signs of infection  She will f/u with both and make a decision on strategy for mammoplasty

## 2023-03-24 ENCOUNTER — RA CDI HCC (OUTPATIENT)
Dept: OTHER | Facility: HOSPITAL | Age: 78
End: 2023-03-24

## 2023-03-24 NOTE — PROGRESS NOTES
[facility]  coding opportunities       Chart reviewed, no opportunity found:   [address]        Patients Insurance     Medicare Insurance: [employer]

## 2023-04-25 ENCOUNTER — OFFICE VISIT (OUTPATIENT)
Dept: INTERNAL MEDICINE CLINIC | Facility: CLINIC | Age: 78
End: 2023-04-25

## 2023-04-25 VITALS
BODY MASS INDEX: 32.13 KG/M2 | HEART RATE: 77 BPM | DIASTOLIC BLOOD PRESSURE: 82 MMHG | HEIGHT: 64 IN | OXYGEN SATURATION: 99 % | WEIGHT: 188.2 LBS | SYSTOLIC BLOOD PRESSURE: 132 MMHG

## 2023-04-25 DIAGNOSIS — C50.912 BREAST CANCER, STAGE 1, LEFT (HCC): ICD-10-CM

## 2023-04-25 DIAGNOSIS — E78.2 MIXED HYPERLIPIDEMIA: ICD-10-CM

## 2023-04-25 DIAGNOSIS — I72.2 RENAL ARTERY ANEURYSM (HCC): ICD-10-CM

## 2023-04-25 DIAGNOSIS — K64.3 GRADE IV HEMORRHOIDS: Primary | ICD-10-CM

## 2023-04-25 DIAGNOSIS — I83.893 SYMPTOMATIC VARICOSE VEINS OF BOTH LOWER EXTREMITIES: ICD-10-CM

## 2023-04-25 PROBLEM — R03.0 ELEVATED BLOOD PRESSURE READING WITHOUT DIAGNOSIS OF HYPERTENSION: Status: RESOLVED | Noted: 2022-04-21 | Resolved: 2023-04-25

## 2023-04-25 RX ORDER — CELECOXIB 50 MG/1
50 CAPSULE ORAL 2 TIMES DAILY PRN
Qty: 60 CAPSULE | Refills: 5 | Status: SHIPPED | OUTPATIENT
Start: 2023-04-25

## 2023-04-25 NOTE — ASSESSMENT & PLAN NOTE
She does not plan mammoplasty at this time   There is about an inch length dimple that is not draining and will continue to be observed

## 2023-04-25 NOTE — PROGRESS NOTES
Assessment/Plan:    Grade IV hemorrhoids  Resolved with Rectiv  colonoscopy scheduled    Renal artery aneurysm (HCC)  6mm on the right noted on CTA in December 1 y f/u recommended    Breast cancer, stage 1, left ([facility] [address] )  She does not plan mammoplasty at this time  There is about an inch length dimple that is not draining and will continue to be observed      Mixed hyperlipidemia  Continue atorvastatin    BMI Counseling: Body mass index is 32 51 kg/m²  The BMI is above normal  Nutrition recommendations include 3-5 servings of fruits/vegetables daily, moderation in carbohydrate intake and reducing intake of saturated fat and trans fat  Exercise recommendations include exercising 3-5 times per week  Problem List Items Addressed This Visit        Digestive    Grade IV hemorrhoids - Primary     Resolved with Rectiv  colonoscopy scheduled            Cardiovascular and Mediastinum    Symptomatic varicose veins of both lower extremities    Relevant Medications    celecoxib (CeleBREX) 50 MG capsule    Renal artery aneurysm (HCC)     6mm on the right noted on CTA in December 1 y f/u recommended         Relevant Orders    CTA abdomen pelvis w wo contrast       Other    Breast cancer, stage 1, left ([facility] [address] )     She does not plan mammoplasty at this time  There is about an inch length dimple that is not draining and will continue to be observed           Relevant Orders    CBC and differential    Comprehensive metabolic panel    Mixed hyperlipidemia     Continue atorvastatin         Relevant Orders    CBC and differential    Comprehensive metabolic panel    Lipid Panel with Direct LDL reflex         Subjective:      Patient ID: Joseph Chatterjee is a 77 y o  female  HPI  Here for a follow up  Scheduled for a colonoscopy  Hemorrhoids resolved with Rectiv  Left breast wound has healed   She has decided not to pursue a mammoplasty    The following portions of the patient's history were reviewed and updated as appropriate: "allergies, current medications, past family history, past medical history, past social history, past surgical history and problem list     Review of Systems   Constitutional: Negative for fever  Respiratory: Negative for shortness of breath  Cardiovascular: Negative for chest pain and palpitations  Gastrointestinal: Negative for abdominal pain, blood in stool, constipation and diarrhea  Objective:      /82 (BP Location: Left arm, Patient Position: Sitting, Cuff Size: Standard)   Pulse 77   Ht 5' 3 8\" (1 621 m)   Wt 85 4 kg (188 lb 3 2 oz)   SpO2 99%   BMI 32 51 kg/m²          Physical Exam  Constitutional:       General: She is not in acute distress  Appearance: She is well-developed  She is obese  She is not ill-appearing, toxic-appearing or diaphoretic  Eyes:      Conjunctiva/sclera: Conjunctivae normal    Cardiovascular:      Rate and Rhythm: Normal rate and regular rhythm  Heart sounds: Normal heart sounds  No murmur heard  Pulmonary:      Effort: Pulmonary effort is normal  No respiratory distress  Breath sounds: Normal breath sounds  No wheezing or rales  Abdominal:      General: There is no distension  Palpations: Abdomen is soft  There is no mass  Tenderness: There is no abdominal tenderness  There is no guarding or rebound  Musculoskeletal:      Cervical back: Neck supple  Right lower leg: No edema  Left lower leg: No edema  Neurological:      Mental Status: She is alert  Psychiatric:         Mood and Affect: Mood normal          Behavior: Behavior normal          Thought Content:  Thought content normal          Judgment: Judgment normal          "

## 2023-04-26 ENCOUNTER — TELEPHONE (OUTPATIENT)
Dept: ADMINISTRATIVE | Facility: OTHER | Age: 78
End: 2023-04-26

## 2023-04-26 NOTE — TELEPHONE ENCOUNTER
----- Message from Blas Kocher, MD sent at 4/25/2023  1:35 PM EDT -----  04/25/23 1:36 PM    Hello, our patient Sherida Mcardle has had DEXA Scan completed/performed  Please assist in updating the patient chart by pulling the Care Everywhere (CE) document  The date of service is Jan 2020       Thank you,  Blas Kocher, MD  [facility]

## 2023-04-26 NOTE — TELEPHONE ENCOUNTER
Upon review of the In Basket request we were able to locate, review, and update the patient chart as requested for DEXA Scan  Any additional questions or concerns should be emailed to the Practice Liaisons via the appropriate education email address, please do not reply via In Basket      Thank you  Kb Morelos

## 2023-11-03 ENCOUNTER — RA CDI HCC (OUTPATIENT)
Dept: OTHER | Facility: HOSPITAL | Age: 78
End: 2023-11-03

## 2023-11-03 NOTE — PROGRESS NOTES
[address] [facility] coding opportunities       Chart reviewed, no opportunity found: [address]        Patients Insurance     Medicare Insurance: [employer]

## 2023-11-10 ENCOUNTER — OFFICE VISIT (OUTPATIENT)
Dept: INTERNAL MEDICINE CLINIC | Facility: CLINIC | Age: 78
End: 2023-11-10

## 2023-11-10 VITALS
WEIGHT: 195 LBS | DIASTOLIC BLOOD PRESSURE: 78 MMHG | HEART RATE: 78 BPM | OXYGEN SATURATION: 99 % | HEIGHT: 64 IN | BODY MASS INDEX: 33.29 KG/M2 | SYSTOLIC BLOOD PRESSURE: 130 MMHG

## 2023-11-10 DIAGNOSIS — F41.9 ANXIETY: ICD-10-CM

## 2023-11-10 DIAGNOSIS — Z23 ENCOUNTER FOR IMMUNIZATION: ICD-10-CM

## 2023-11-10 DIAGNOSIS — I83.893 SYMPTOMATIC VARICOSE VEINS OF BOTH LOWER EXTREMITIES: ICD-10-CM

## 2023-11-10 DIAGNOSIS — E66.01 OBESITY, MORBID (HCC): ICD-10-CM

## 2023-11-10 DIAGNOSIS — Z00.00 MEDICARE ANNUAL WELLNESS VISIT, SUBSEQUENT: Primary | ICD-10-CM

## 2023-11-10 DIAGNOSIS — E78.2 MIXED HYPERLIPIDEMIA: ICD-10-CM

## 2023-11-10 DIAGNOSIS — I72.2 RENAL ARTERY ANEURYSM (HCC): ICD-10-CM

## 2023-11-10 RX ORDER — ALPRAZOLAM 0.25 MG/1
0.25 TABLET ORAL DAILY PRN
Qty: 30 TABLET | Refills: 2 | Status: SHIPPED | OUTPATIENT
Start: 2023-11-10

## 2023-11-10 RX ORDER — CELECOXIB 50 MG/1
50 CAPSULE ORAL 2 TIMES DAILY PRN
Qty: 60 CAPSULE | Refills: 5 | Status: SHIPPED | OUTPATIENT
Start: 2023-11-10

## 2023-11-10 NOTE — PATIENT INSTRUCTIONS
Medicare Preventive Visit Patient Instructions  Thank you for completing your Welcome to Medicare Visit or Medicare Annual Wellness Visit today. Your next wellness visit will be due in one year (11/10/2024). The screening/preventive services that you may require over the next 5-10 years are detailed below. Some tests may not apply to you based off risk factors and/or age. Screening tests ordered at today's visit but not completed yet may show as past due. Also, please note that scanned in results may not display below. Preventive Screenings:  Service Recommendations Previous Testing/Comments   Colorectal Cancer Screening  * Colonoscopy    * Fecal Occult Blood Test (FOBT)/Fecal Immunochemical Test (FIT)  * Fecal DNA/Cologuard Test  * Flexible Sigmoidoscopy Age: 43-73 years old   Colonoscopy: every 10 years (may be performed more frequently if at higher risk)  OR  FOBT/FIT: every 1 year  OR  Cologuard: every 3 years  OR  Sigmoidoscopy: every 5 years  Screening may be recommended earlier than age 39 if at higher risk for colorectal cancer. Also, an individualized decision between you and your healthcare provider will decide whether screening between the ages of 77-80 would be appropriate. Colonoscopy: 02/07/2018  FOBT/FIT: Not on file  Cologuard: Not on file  Sigmoidoscopy: Not on file          Breast Cancer Screening Age: 36 years old  Frequency: every 1-2 years  Not required if history of left and right mastectomy Mammogram: 05/25/2022        Cervical Cancer Screening Between the ages of 21-29, pap smear recommended once every 3 years. Between the ages of 32-69, can perform pap smear with HPV co-testing every 5 years.    Recommendations may differ for women with a history of total hysterectomy, cervical cancer, or abnormal pap smears in past. Pap Smear: Not on file        Hepatitis C Screening Once for adults born between [address] [place] [address]  More frequently in patients at high risk for Hepatitis C Hep C Antibody: Not on file        Diabetes Screening 1-2 times per year if you're at risk for diabetes or have pre-diabetes Fasting glucose: 98 mg/dL (7/8/2022)  A1C: No results in last 5 years (No results in last 5 years)      Cholesterol Screening Once every 5 years if you don't have a lipid disorder. May order more often based on risk factors. Lipid panel: 07/08/2022          Other Preventive Screenings Covered by Medicare:  Abdominal Aortic Aneurysm (AAA) Screening: covered once if your at risk. You're considered to be at risk if you have a family history of AAA. Lung Cancer Screening: covers low dose CT scan once per year if you meet all of the following conditions: (1) Age 48-67; (2) No signs or symptoms of lung cancer; (3) Current smoker or have quit smoking within the last 15 years; (4) You have a tobacco smoking history of at least 20 pack years (packs per day multiplied by number of years you smoked); (5) You get a written order from a healthcare provider. Glaucoma Screening: covered annually if you're considered high risk: (1) You have diabetes OR (2) Family history of glaucoma OR (3)  aged 48 and older OR (3)  American aged 72 and older  Osteoporosis Screening: covered every 2 years if you meet one of the following conditions: (1) You're estrogen deficient and at risk for osteoporosis based off medical history and other findings; (2) Have a vertebral abnormality; (3) On glucocorticoid therapy for more than 3 months; (4) Have primary hyperparathyroidism; (5) On osteoporosis medications and need to assess response to drug therapy. Last bone density test (DXA Scan): 01/16/2020. HIV Screening: covered annually if you're between the age of 14-79. Also covered annually if you are younger than 13 and older than 72 with risk factors for HIV infection. For pregnant patients, it is covered up to 3 times per pregnancy.     Immunizations:  Immunization Recommendations   Influenza Vaccine Annual influenza vaccination during flu season is recommended for all persons aged >= 6 months who do not have contraindications   Pneumococcal Vaccine   * Pneumococcal conjugate vaccine = PCV13 (Prevnar 13), PCV15 (Vaxneuvance), PCV20 (Prevnar 20)  * Pneumococcal polysaccharide vaccine = PPSV23 (Pneumovax) Adults 25-07 yo with certain risk factors or if 69+ yo  If never received any pneumonia vaccine: recommend Prevnar 20 (PCV20)  Give PCV20 if previously received 1 dose of PCV13 or PPSV23   Hepatitis B Vaccine 3 dose series if at intermediate or high risk (ex: diabetes, end stage renal disease, liver disease)   Respiratory syncytial virus (RSV) Vaccine - COVERED BY MEDICARE PART D  * RSVPreF3 (Arexvy) CDC recommends that adults 61years of age and older may receive a single dose of RSV vaccine using shared clinical decision-making (SCDM)   Tetanus (Td) Vaccine - COST NOT COVERED BY MEDICARE PART B Following completion of primary series, a booster dose should be given every 10 years to maintain immunity against tetanus. Td may also be given as tetanus wound prophylaxis. Tdap Vaccine - COST NOT COVERED BY MEDICARE PART B Recommended at least once for all adults. For pregnant patients, recommended with each pregnancy. Shingles Vaccine (Shingrix) - COST NOT COVERED BY MEDICARE PART B  2 shot series recommended in those 19 years and older who have or will have weakened immune systems or those 50 years and older     Health Maintenance Due:      Topic Date Due   • Colorectal Cancer Screening  02/07/2023   • Hepatitis C Screening  05/25/2025 (Originally 1945)     Immunizations Due:      Topic Date Due   • COVID-19 Vaccine (4 - Pfizer series) 10/25/2021   • Influenza Vaccine (1) 09/01/2023     Advance Directives   What are advance directives? Advance directives are legal documents that state your wishes and plans for medical care. These plans are made ahead of time in case you lose your ability to make decisions for yourself. Advance directives can apply to any medical decision, such as the treatments you want, and if you want to donate organs. What are the types of advance directives? There are many types of advance directives, and each state has rules about how to use them. You may choose a combination of any of the following:  Living will: This is a written record of the treatment you want. You can also choose which treatments you do not want, which to limit, and which to stop at a certain time. This includes surgery, medicine, IV fluid, and tube feedings. Durable power of  for [facility]): This is a written record that states who you want to make healthcare choices for you when you are unable to make them for yourself. This person, called a proxy, is usually a family member or a friend. You may choose more than 1 proxy. Do not resuscitate (DNR) order:  A DNR order is used in case your heart stops beating or you stop breathing. It is a request not to have certain forms of treatment, such as CPR. A DNR order may be included in other types of advance directives. Medical directive: This covers the care that you want if you are in a coma, near death, or unable to make decisions for yourself. You can list the treatments you want for each condition. Treatment may include pain medicine, surgery, blood transfusions, dialysis, IV or tube feedings, and a ventilator (breathing machine). Values history: This document has questions about your views, beliefs, and how you feel and think about life. This information can help others choose the care that you would choose. Why are advance directives important? An advance directive helps you control your care. Although spoken wishes may be used, it is better to have your wishes written down. Spoken wishes can be misunderstood, or not followed. Treatments may be given even if you do not want them.  An advance directive may make it easier for your family to make difficult choices about your care. Weight Management   Why it is important to manage your weight:  Being overweight increases your risk of health conditions such as heart disease, high blood pressure, type 2 diabetes, and certain types of cancer. It can also increase your risk for osteoarthritis, sleep apnea, and other respiratory problems. Aim for a slow, steady weight loss. Even a small amount of weight loss can lower your risk of health problems. How to lose weight safely:  A safe and healthy way to lose weight is to eat fewer calories and get regular exercise. You can lose up about 1 pound a week by decreasing the number of calories you eat by 500 calories each day. Healthy meal plan for weight management:  A healthy meal plan includes a variety of foods, contains fewer calories, and helps you stay healthy. A healthy meal plan includes the following:  Eat whole-grain foods more often. A healthy meal plan should contain fiber. Fiber is the part of grains, fruits, and vegetables that is not broken down by your body. Whole-grain foods are healthy and provide extra fiber in your diet. Some examples of whole-grain foods are whole-wheat breads and pastas, oatmeal, brown rice, and bulgur. Eat a variety of vegetables every day. Include dark, leafy greens such as spinach, kale, belkys greens, and mustard greens. Eat yellow and orange vegetables such as carrots, sweet potatoes, and winter squash. Eat a variety of fruits every day. Choose fresh or canned fruit (canned in its own juice or light syrup) instead of juice. Fruit juice has very little or no fiber. Eat low-fat dairy foods. Drink fat-free (skim) milk or 1% milk. Eat fat-free yogurt and low-fat cottage cheese. Try low-fat cheeses such as mozzarella and other reduced-fat cheeses. Choose meat and other protein foods that are low in fat. Choose beans or other legumes such as split peas or lentils.  Choose fish, skinless poultry (chicken or turkey), or lean cuts of red meat (beef or pork). Before you cook meat or poultry, cut off any visible fat. Use less fat and oil. Try baking foods instead of frying them. Add less fat, such as margarine, sour cream, regular salad dressing and mayonnaise to foods. Eat fewer high-fat foods. Some examples of high-fat foods include french fries, doughnuts, ice cream, and cakes. Eat fewer sweets. Limit foods and drinks that are high in sugar. This includes candy, cookies, regular soda, and sweetened drinks. Exercise:  Exercise at least 30 minutes per day on most days of the week. Some examples of exercise include walking, biking, dancing, and swimming. You can also fit in more physical activity by taking the stairs instead of the elevator or parking farther away from stores. Ask your healthcare provider about the best exercise plan for you. © Copyright "[employer] 2018 Information is for End User's use only and may not be sold, redistributed or otherwise used for commercial purposes.  All illustrations and images included in CareNotes® are the copyrighted property of [employer] or [address] [place] [address]

## 2023-11-10 NOTE — PROGRESS NOTES
Assessment and Plan:     Problem List Items Addressed This Visit        Cardiovascular and Mediastinum    Symptomatic varicose veins of both lower extremities    Relevant Medications    celecoxib (CeleBREX) 50 MG capsule    Renal artery aneurysm (HCC)     F/U CT recommended to reassess 6mm right renal hilar saccular aneurysm         Relevant Orders    CTA abdominal w run off w wo contrast       Other    Obesity, morbid (HCC)    Anxiety    Relevant Medications    ALPRAZolam (XANAX) 0.25 mg tablet    Mixed hyperlipidemia    Relevant Orders    Lipid panel    CBC and differential    Comprehensive metabolic panel   Other Visit Diagnoses     Medicare annual wellness visit, subsequent    -  Primary    Encounter for immunization        Relevant Orders    influenza vaccine, high-dose, PF 0.7 mL (FLUZONE HIGH-DOSE)            Depression Screening and Follow-up Plan: Patient was screened for depression during today's encounter. They screened negative with a PHQ-2 score of 0. BMI Counseling: Body mass index is 33.68 kg/m². The BMI is above normal. Nutrition recommendations include consuming healthier snacks, moderation in carbohydrate intake, and reducing intake of saturated fat and trans fat. Preventive health issues were discussed with patient, and age appropriate screening tests were ordered as noted in patient's After Visit Summary. Consider shingles, RSV and COVID vaccines  Personalized health advice and appropriate referrals for health education or preventive services given if needed, as noted in patient's After Visit Summary. History of Present Illness:     Patient presents for a Medicare Wellness Visit    HPI   Patient Care Team:  Tatiana Crockett MD as PCP - [facility],   Soumya Keith (Vascular Surgery)  Ariana Hahn MD (Vascular Surgery)     Review of Systems:     Review of Systems   Constitutional:  Negative for fever and unexpected weight change.    Respiratory:  Negative for shortness of breath. Cardiovascular:  Negative for chest pain and palpitations. Gastrointestinal:  Negative for abdominal pain, anal bleeding, constipation and diarrhea. Genitourinary:  Negative for difficulty urinating. Musculoskeletal:  Positive for arthralgias. Neurological:  Negative for dizziness and headaches. Problem List:     Patient Active Problem List   Diagnosis   • Breast cancer, stage 1, left ([address])   • History of breast cancer   • Pulmonary nodule   • Lumbar spondylosis   • Spinal stenosis, lumbar region, with neurogenic claudication   • Symptomatic varicose veins of both lower extremities   • H/O right mastectomy   • Mixed hyperlipidemia   • Anxiety   • Obesity, morbid ([address])   • Grade IV hemorrhoids   • Renal artery aneurysm [facility])      Past Medical and Surgical History:     Past Medical History:   Diagnosis Date   • Breast cancer ([address])     B/L   • Cancer ([address])    • Chronic low back pain    • Elevated blood pressure reading without diagnosis of hypertension 4/21/2022   • Endometrial cancer (HCC)    • PONV (postoperative nausea and vomiting)    • Uterine cancer ([address]) 11/4/2012     Past Surgical History:   Procedure Laterality Date   • BREAST LUMPECTOMY Left    • BREAST RECONSTRUCTION Right 2010   • COLONOSCOPY     • JOINT REPLACEMENT Bilateral    • KNEE ARTHROSCOPY Bilateral    • MASTECTOMY Right     2003   • ORTHOPEDIC SURGERY Bilateral     knee arthoscopy   • NM LAPAROSCOPY SURG CHOLECYSTECTOMY N/A 2/15/2019    Procedure: CHOLECYSTECTOMY LAPAROSCOPIC;  Surgeon: Sorin Garcia MD;  Location: [facility];  Service: General   • RADICAL ABDOMINAL HYSTERECTOMY     • REDUCTION MAMMAPLASTY Left       Family History:     Family History   Problem Relation Age of Onset   • Stroke Mother    • Hypertension Mother    • Cancer Father       Social History:     Social History     Socioeconomic History   • Marital status:       Spouse name: None   • Number of children: None   • Years of education: None   • Highest education level: None   Occupational History   • Occupation: retired   Tobacco Use   • Smoking status: Never   • Smokeless tobacco: Never   Vaping Use   • Vaping Use: Never used   Substance and Sexual Activity   • Alcohol use: No   • Drug use: No   • Sexual activity: Never   Other Topics Concern   • None   Social History Narrative    Lives with adult daughter     Social Determinants of Health     Financial Resource Strain: Medium Risk (11/3/2023)    Overall Financial Resource Strain (CARDIA)    • Difficulty of Paying Living Expenses: Somewhat hard   Food Insecurity: Not on file   Transportation Needs: No Transportation Needs (11/3/2023)    PRAPARE - Transportation    • Lack of Transportation (Medical): No    • Lack of Transportation (Non-Medical): No   Physical Activity: Not on file   Stress: Not on file   Social Connections: Not on file   Intimate Partner Violence: Not on file   Housing Stability: Not on file      Medications and Allergies:     Current Outpatient Medications   Medication Sig Dispense Refill   • ALPRAZolam (XANAX) 0.25 mg tablet Take 1 tablet (0.25 mg total) by mouth daily as needed for anxiety 30 tablet 2   • atorvastatin (LIPITOR) 10 mg tablet take 1 tablet by mouth once daily 90 tablet 0   • Calcium Carbonate-Vitamin D 600-400 MG-UNIT per tablet Take 1 tablet by mouth 2 (two) times a day      • celecoxib (CeleBREX) 50 MG capsule Take 1 capsule (50 mg total) by mouth 2 (two) times a day as needed for mild pain 60 capsule 5   • Diclofenac Sodium 3 % GEL Apply 2 g topically 2 (two) times a day as needed (pain) GI intolerance with ASA Unable to take nsaids due to age 100 g 0   • nitroglycerin (RECTIV) 0.4 % Insert into the rectum every 12 (twelve) hours (Patient not taking: Reported on 4/25/2023) 30 g 0     No current facility-administered medications for this visit.      Allergies   Allergen Reactions   • Latex Itching and Rash     If wearing latex bandages   • Penicillin V Rash   • Other Other (See Comments)     rash   • Aspirin GI Intolerance   • Citrus - Food Allergy Itching and Rash     ACIDIC FOODS   • Oxycodone-Acetaminophen GI Intolerance and Headache   • Wound Dressings Itching     Red and blisters      Immunizations:     Immunization History   Administered Date(s) Administered   • COVID-19 PFIZER VACCINE 0.3 ML IM 02/19/2021, 03/12/2021, 08/30/2021   • Influenza Split High Dose Preservative Free IM 11/01/2014   • Influenza, Seasonal Vaccine, Quadrivalent, Adjuvanted, . 5e 10/30/2020   • Influenza, high dose seasonal 0.7 mL 02/10/2020, 12/28/2021   • Pneumococcal Conjugate 13-Valent 07/15/2019   • Pneumococcal Polysaccharide PPV23 11/01/2014      Health Maintenance:         Topic Date Due   • Colorectal Cancer Screening  02/07/2023   • Hepatitis C Screening  05/25/2025 (Originally 1945)         Topic Date Due   • COVID-19 Vaccine (4 - Pfizer series) 10/25/2021   • Influenza Vaccine (1) 09/01/2023      Medicare Screening Tests and Risk Assessments:     Jacob is here for her Subsequent Wellness visit. Health Risk Assessment:   Patient rates overall health as good. Patient feels that their physical health rating is same. Patient is satisfied with their life. Eyesight was rated as same. Hearing was rated as same. Patient feels that their emotional and mental health rating is slightly better. Patients states they are never, rarely angry. Patient states they are sometimes unusually tired/fatigued. Pain experienced in the last 7 days has been some. Patient's pain rating has been 5/10. Patient states that she has experienced weight loss or gain in last 6 months. Continued Healing from surgery    Depression Screening:   PHQ-2 Score: 0      Fall Risk Screening: In the past year, patient has experienced: no history of falling in past year      Urinary Incontinence Screening:   Patient has not leaked urine accidently in the last six months.      Home Safety:  Patient does not have trouble with stairs inside or outside of their home. Patient has working smoke alarms and has working carbon monoxide detector. Home safety hazards include: none. Nutrition:   Current diet is Low Cholesterol. Medications:   Patient is currently taking over-the-counter supplements. OTC medications include: see medication list. Patient is able to manage medications. Activities of Daily Living (ADLs)/Instrumental Activities of Daily Living (IADLs):   Walk and transfer into and out of bed and chair?: Yes  Dress and groom yourself?: Yes    Bathe or shower yourself?: Yes    Feed yourself?  Yes  Do your laundry/housekeeping?: No  Manage your money, pay your bills and track your expenses?: Yes  Make your own meals?: No    Do your own shopping?: No    ADL comments: My daughter does the three indicated NOs    Previous Hospitalizations:   Any hospitalizations or ED visits within the last 12 months?: No      Advance Care Planning:   Living will: Yes    Durable POA for healthcare: No    Advanced directive: Yes      Cognitive Screening:   Provider or family/friend/caregiver concerned regarding cognition?: No    PREVENTIVE SCREENINGS      Cardiovascular Screening:    General: Screening Not Indicated and History Lipid Disorder    Due for: Lipid Panel      Diabetes Screening:       Due for: Blood Glucose      Colorectal Cancer Screening:     General: Screening Current      Breast Cancer Screening:     General: History Breast Cancer and Screening Not Indicated      Cervical Cancer Screening:    General: Screening Not Indicated      Abdominal Aortic Aneurysm (AAA) Screening:        General: Screening Not Indicated      Lung Cancer Screening:     General: Screening Not Indicated      Hepatitis C Screening:    General: Screening Current    Screening, Brief Intervention, and Referral to Treatment (SBIRT)    Screening  Typical number of drinks in a day: 0  Typical number of drinks in a week: 0  Interpretation: Low risk drinking behavior. AUDIT-C Screenin) How often did you have a drink containing alcohol in the past year? never  2) How many drinks did you have on a typical day when you were drinking in the past year? 0  3) How often did you have 6 or more drinks on one occasion in the past year? never    AUDIT-C Score: 0  Interpretation: Score 0-2 (female): Negative screen for alcohol misuse    Single Item Drug Screening:  How often have you used an illegal drug (including marijuana) or a prescription medication for non-medical reasons in the past year? never    Single Item Drug Screen Score: 0  Interpretation: Negative screen for possible drug use disorder    No results found. Physical Exam:     /78 (BP Location: Left arm, Patient Position: Sitting, Cuff Size: Standard)   Pulse 78   Ht 5' 3.8" (1.621 m)   Wt 88.5 kg (195 lb)   SpO2 99%   BMI 33.68 kg/m²     Physical Exam  Constitutional:       General: She is not in acute distress. Appearance: She is well-developed. She is not ill-appearing, toxic-appearing or diaphoretic. HENT:      Right Ear: External ear normal. There is no impacted cerumen. Left Ear: External ear normal. There is no impacted cerumen. Eyes:      Conjunctiva/sclera: Conjunctivae normal.   Cardiovascular:      Rate and Rhythm: Normal rate and regular rhythm. Heart sounds: Normal heart sounds. No murmur heard. Pulmonary:      Effort: Pulmonary effort is normal. No respiratory distress. Breath sounds: Normal breath sounds. No stridor. No wheezing or rales. Abdominal:      General: There is no distension. Palpations: Abdomen is soft. There is no mass. Tenderness: There is no abdominal tenderness. There is no guarding or rebound. Musculoskeletal:      Cervical back: Neck supple. Right lower leg: No edema. Left lower leg: No edema. Neurological:      Mental Status: She is alert and oriented to person, place, and time.    Psychiatric:         Mood and Affect: Mood normal.         Behavior: Behavior normal.         Thought Content:  Thought content normal.         Judgment: Judgment normal.          He Jj MD Include Pregnancy/Lactation Warning?: No

## 2023-11-30 ENCOUNTER — APPOINTMENT (OUTPATIENT)
Dept: LAB | Age: 78
End: 2023-11-30
Payer: COMMERCIAL

## 2023-11-30 DIAGNOSIS — E78.2 MIXED HYPERLIPIDEMIA: ICD-10-CM

## 2023-11-30 LAB
ALBUMIN SERPL BCP-MCNC: 3.9 G/DL (ref 3.5–5)
ALP SERPL-CCNC: 61 U/L (ref 34–104)
ALT SERPL W P-5'-P-CCNC: 12 U/L (ref 7–52)
ANION GAP SERPL CALCULATED.3IONS-SCNC: 9 MMOL/L
AST SERPL W P-5'-P-CCNC: 15 U/L (ref 13–39)
BASOPHILS # BLD AUTO: 0.07 THOUSANDS/ÂΜL (ref 0–0.1)
BASOPHILS NFR BLD AUTO: 1 % (ref 0–1)
BILIRUB SERPL-MCNC: 0.79 MG/DL (ref 0.2–1)
BUN SERPL-MCNC: 12 MG/DL (ref 5–25)
CALCIUM SERPL-MCNC: 10.2 MG/DL (ref 8.4–10.2)
CHLORIDE SERPL-SCNC: 106 MMOL/L (ref 96–108)
CHOLEST SERPL-MCNC: 239 MG/DL
CO2 SERPL-SCNC: 28 MMOL/L (ref 21–32)
CREAT SERPL-MCNC: 0.94 MG/DL (ref 0.6–1.3)
EOSINOPHIL # BLD AUTO: 0.31 THOUSAND/ÂΜL (ref 0–0.61)
EOSINOPHIL NFR BLD AUTO: 4 % (ref 0–6)
ERYTHROCYTE [DISTWIDTH] IN BLOOD BY AUTOMATED COUNT: 14.3 % (ref 11.6–15.1)
GFR SERPL CREATININE-BSD FRML MDRD: 57 ML/MIN/1.73SQ M
GLUCOSE P FAST SERPL-MCNC: 86 MG/DL (ref 65–99)
HCT VFR BLD AUTO: 42.6 % (ref 34.8–46.1)
HDLC SERPL-MCNC: 52 MG/DL
HGB BLD-MCNC: 14.2 G/DL (ref 11.5–15.4)
IMM GRANULOCYTES # BLD AUTO: 0.01 THOUSAND/UL (ref 0–0.2)
IMM GRANULOCYTES NFR BLD AUTO: 0 % (ref 0–2)
LDLC SERPL CALC-MCNC: 159 MG/DL (ref 0–100)
LYMPHOCYTES # BLD AUTO: 3.2 THOUSANDS/ÂΜL (ref 0.6–4.47)
LYMPHOCYTES NFR BLD AUTO: 37 % (ref 14–44)
MCH RBC QN AUTO: 29 PG (ref 26.8–34.3)
MCHC RBC AUTO-ENTMCNC: 33.3 G/DL (ref 31.4–37.4)
MCV RBC AUTO: 87 FL (ref 82–98)
MONOCYTES # BLD AUTO: 0.62 THOUSAND/ÂΜL (ref 0.17–1.22)
MONOCYTES NFR BLD AUTO: 7 % (ref 4–12)
NEUTROPHILS # BLD AUTO: 4.42 THOUSANDS/ÂΜL (ref 1.85–7.62)
NEUTS SEG NFR BLD AUTO: 51 % (ref 43–75)
NRBC BLD AUTO-RTO: 0 /100 WBCS
PLATELET # BLD AUTO: 256 THOUSANDS/UL (ref 149–390)
PMV BLD AUTO: 11.3 FL (ref 8.9–12.7)
POTASSIUM SERPL-SCNC: 4.1 MMOL/L (ref 3.5–5.3)
PROT SERPL-MCNC: 6.8 G/DL (ref 6.4–8.4)
RBC # BLD AUTO: 4.89 MILLION/UL (ref 3.81–5.12)
SODIUM SERPL-SCNC: 143 MMOL/L (ref 135–147)
TRIGL SERPL-MCNC: 140 MG/DL
WBC # BLD AUTO: 8.63 THOUSAND/UL (ref 4.31–10.16)

## 2023-11-30 PROCEDURE — 85025 COMPLETE CBC W/AUTO DIFF WBC: CPT

## 2023-11-30 PROCEDURE — 36415 COLL VENOUS BLD VENIPUNCTURE: CPT

## 2023-11-30 PROCEDURE — 80053 COMPREHEN METABOLIC PANEL: CPT

## 2023-11-30 PROCEDURE — 80061 LIPID PANEL: CPT

## 2023-12-04 ENCOUNTER — HOSPITAL ENCOUNTER (OUTPATIENT)
Dept: RADIOLOGY | Facility: HOSPITAL | Age: 78
Discharge: HOME | End: 2023-12-04
Payer: COMMERCIAL

## 2023-12-04 DIAGNOSIS — I72.2 RENAL ARTERY ANEURYSM (HCC): ICD-10-CM

## 2023-12-04 DIAGNOSIS — E78.2 MIXED HYPERLIPIDEMIA: ICD-10-CM

## 2023-12-04 PROCEDURE — 75635 CT ANGIO ABDOMINAL ARTERIES: CPT

## 2023-12-04 PROCEDURE — G1004 CDSM NDSC: HCPCS

## 2023-12-04 RX ORDER — ATORVASTATIN CALCIUM 10 MG/1
10 TABLET, FILM COATED ORAL DAILY
Qty: 90 TABLET | Refills: 0 | Status: SHIPPED | OUTPATIENT
Start: 2023-12-04

## 2023-12-04 RX ADMIN — IOHEXOL 120 ML: 350 INJECTION, SOLUTION INTRAVENOUS at 12:30

## 2024-03-25 ENCOUNTER — TELEPHONE (OUTPATIENT)
Age: 79
End: 2024-03-25

## 2024-03-25 DIAGNOSIS — M25.561 PAIN AND SWELLING OF RIGHT KNEE: Primary | ICD-10-CM

## 2024-03-25 DIAGNOSIS — M25.461 PAIN AND SWELLING OF RIGHT KNEE: Primary | ICD-10-CM

## 2024-03-25 NOTE — TELEPHONE ENCOUNTER
Schedule visit, use same day. I placed an order for a right knee Xray which she can get done ASAP at [facility]

## 2024-03-25 NOTE — TELEPHONE ENCOUNTER
Pt is experiencing pain below the front of her right knee. It is swollen, but no bruising. Pt had a visiting nurse come on 3/20/24 and advised she have her knee looked at as soon as possible.     Pt took the next available with Dr. Reyes (she would like to only see her), but also wanted to make Dr. Reyes aware of the discomfort and pain she is experiencing incase Dr. Reyes is able to see her sooner.    Please advise, thank you

## 2024-03-26 ENCOUNTER — APPOINTMENT (OUTPATIENT)
Dept: RADIOLOGY | Age: 79
End: 2024-03-26
Payer: COMMERCIAL

## 2024-03-26 DIAGNOSIS — M25.461 PAIN AND SWELLING OF RIGHT KNEE: ICD-10-CM

## 2024-03-26 DIAGNOSIS — M25.561 PAIN AND SWELLING OF RIGHT KNEE: ICD-10-CM

## 2024-03-26 PROCEDURE — 73562 X-RAY EXAM OF KNEE 3: CPT

## 2024-03-29 ENCOUNTER — RA CDI HCC (OUTPATIENT)
Dept: OTHER | Facility: HOSPITAL | Age: 79
End: 2024-03-29

## 2024-03-29 PROBLEM — E66.01 OBESITY, MORBID (HCC): Status: RESOLVED | Noted: 2021-08-09 | Resolved: 2024-03-29

## 2024-04-05 ENCOUNTER — OFFICE VISIT (OUTPATIENT)
Dept: INTERNAL MEDICINE CLINIC | Facility: CLINIC | Age: 79
End: 2024-04-05
Payer: COMMERCIAL

## 2024-04-05 VITALS
WEIGHT: 193.4 LBS | HEART RATE: 78 BPM | DIASTOLIC BLOOD PRESSURE: 80 MMHG | BODY MASS INDEX: 33.02 KG/M2 | OXYGEN SATURATION: 98 % | HEIGHT: 64 IN | SYSTOLIC BLOOD PRESSURE: 130 MMHG

## 2024-04-05 DIAGNOSIS — M79.661 RIGHT CALF PAIN: Primary | ICD-10-CM

## 2024-04-05 DIAGNOSIS — I72.2 RENAL ARTERY ANEURYSM (HCC): ICD-10-CM

## 2024-04-05 DIAGNOSIS — C50.912 BREAST CANCER, STAGE 1, LEFT (HCC): ICD-10-CM

## 2024-04-05 PROCEDURE — G2211 COMPLEX E/M VISIT ADD ON: HCPCS | Performed by: INTERNAL MEDICINE

## 2024-04-05 PROCEDURE — 99213 OFFICE O/P EST LOW 20 MIN: CPT | Performed by: INTERNAL MEDICINE

## 2024-04-05 NOTE — PROGRESS NOTES
"Name: Jacob Brady      : 1945      MRN: [id number]  Encounter Provider: Lea Reyes, MD  Encounter Date: 2024   Enc[facility]    Assessment & Plan     1. Right calf pain  Comments:  right femoral DVT in 2019 and was on anticoagulation, follow up US showed resolution of DVT; current symptoms appear muscular, obtain US and doppler  Orders:  -     US MSK limited; Future; Expected date: 2024  -     VAS VENOUS DUPLEX -LOWER LIMB UNILATERAL; Future; Expected date: 2024    2. Renal artery aneurysm (HCC)  Assessment & Plan:  Stable 5-6mm distal right renal artery aneurysm between [id number]      3. Breast cancer, stage 1, left (HCC)  Assessment & Plan:  S/p mastectomy             Subjective      Chronic painful/tender and swollen area in the upper inner calf, no redness  Area hurts when she walks, stands  Right knee Xray shows prosthesis in place        Review of Systems   Constitutional:  Negative for fever.   Respiratory:  Negative for shortness of breath.        Current Outpatient Medications on File Prior to Visit   Medication Sig    ALPRAZolam (XANAX) 0.25 mg tablet Take 1 tablet (0.25 mg total) by mouth daily as needed for anxiety    atorvastatin (LIPITOR) 10 mg tablet Take 1 tablet (10 mg total) by mouth daily    Calcium Carbonate-Vitamin D 600-400 MG-UNIT per tablet Take 1 tablet by mouth 2 (two) times a day     celecoxib (CeleBREX) 50 MG capsule Take 1 capsule (50 mg total) by mouth 2 (two) times a day as needed for mild pain    Diclofenac Sodium 3 % GEL Apply 2 g topically 2 (two) times a day as needed (pain) GI intolerance with ASA Unable to take nsaids due to age    nitroglycerin (RECTIV) 0.4 % Insert into the rectum every 12 (twelve) hours (Patient not taking: Reported on 2023)       Objective     /80 (BP Location: Left arm, Patient Position: Sitting, Cuff Size: Standard)   Pulse 78   Ht 5' 3.8\" (1.621 m)   Wt 87.7 kg (193 " lb 6.4 oz)   SpO2 98%   BMI 33.41 kg/m²     Physical Exam  Constitutional:       Appearance: She is not ill-appearing.   Pulmonary:      Effort: No respiratory distress.   Musculoskeletal:        Legs:        Lea Reyes, MD

## 2024-04-07 ENCOUNTER — TELEPHONE (OUTPATIENT)
Dept: INTERNAL MEDICINE CLINIC | Facility: CLINIC | Age: 79
End: 2024-04-07

## 2024-04-07 PROBLEM — Z90.12 H/O LEFT MASTECTOMY: Status: ACTIVE | Noted: 2024-04-07

## 2024-04-07 NOTE — TELEPHONE ENCOUNTER
"Please call the patient that I also want her to get an ultrasound of her leg to look for a clot in addition to the ultrasound to look at the muscle. Please help her schedule the appointments. I entered the venous doppler \"stat\".  "

## 2024-04-08 ENCOUNTER — HOSPITAL ENCOUNTER (OUTPATIENT)
Dept: NON INVASIVE DIAGNOSTICS | Facility: HOSPITAL | Age: 79
Discharge: HOME | End: 2024-04-08
Payer: COMMERCIAL

## 2024-04-08 DIAGNOSIS — M79.661 RIGHT CALF PAIN: Primary | ICD-10-CM

## 2024-04-08 DIAGNOSIS — M79.661 RIGHT CALF PAIN: ICD-10-CM

## 2024-04-08 PROCEDURE — 93971 EXTREMITY STUDY: CPT

## 2024-04-08 NOTE — TELEPHONE ENCOUNTER
I'm seeing routine for both  VAS VENOUS DUPLEX -LOWER LIMB UNILATERAL       (Order ID: [id number])     Diagnosis:  Right calf pain (M79.661 [ICD-10-CM])  Priority:  Routine

## 2024-04-08 NOTE — TELEPHONE ENCOUNTER
I'm seeing routine for both  VAS VENOUS DUPLEX -LOWER LIMB UNILATERAL       (Order ID: [id number])     Diagnosis:  Right calf pain (M79.661 [ICD-10-CM])  Priority:  Routine

## 2024-04-10 PROCEDURE — 93971 EXTREMITY STUDY: CPT | Performed by: SURGERY

## 2024-04-18 ENCOUNTER — TELEPHONE (OUTPATIENT)
Age: 79
End: 2024-04-18

## 2024-04-18 NOTE — TELEPHONE ENCOUNTER
"Patient called back regarding test results . Reviewed chart . Aware Normal    \"Call that the doppler ultrasound does not reveal a blood clot in the legs. \"  "

## 2024-05-08 ENCOUNTER — HOSPITAL ENCOUNTER (OUTPATIENT)
Dept: ULTRASOUND IMAGING | Facility: HOSPITAL | Age: 79
Discharge: HOME | End: 2024-05-08
Payer: COMMERCIAL

## 2024-05-08 DIAGNOSIS — M79.661 RIGHT CALF PAIN: ICD-10-CM

## 2024-05-08 PROCEDURE — 76882 US LMTD JT/FCL EVL NVASC XTR: CPT

## 2024-05-09 ENCOUNTER — RA CDI HCC (OUTPATIENT)
Dept: OTHER | Facility: HOSPITAL | Age: 79
End: 2024-05-09

## 2024-06-10 ENCOUNTER — OFFICE VISIT (OUTPATIENT)
Dept: INTERNAL MEDICINE CLINIC | Facility: CLINIC | Age: 79
End: 2024-06-10
Payer: COMMERCIAL

## 2024-06-10 VITALS
DIASTOLIC BLOOD PRESSURE: 82 MMHG | WEIGHT: 192 LBS | OXYGEN SATURATION: 95 % | SYSTOLIC BLOOD PRESSURE: 142 MMHG | BODY MASS INDEX: 33.16 KG/M2 | HEART RATE: 75 BPM

## 2024-06-10 DIAGNOSIS — E78.2 MIXED HYPERLIPIDEMIA: ICD-10-CM

## 2024-06-10 DIAGNOSIS — N18.31 STAGE 3A CHRONIC KIDNEY DISEASE (HCC): ICD-10-CM

## 2024-06-10 DIAGNOSIS — M25.561 CHRONIC PAIN OF RIGHT KNEE: Primary | ICD-10-CM

## 2024-06-10 DIAGNOSIS — G89.29 CHRONIC PAIN OF RIGHT KNEE: Primary | ICD-10-CM

## 2024-06-10 PROBLEM — N18.30 STAGE 3 CHRONIC KIDNEY DISEASE, UNSPECIFIED WHETHER STAGE 3A OR 3B CKD (HCC): Status: ACTIVE | Noted: 2024-06-10

## 2024-06-10 PROCEDURE — G2211 COMPLEX E/M VISIT ADD ON: HCPCS | Performed by: INTERNAL MEDICINE

## 2024-06-10 PROCEDURE — 99214 OFFICE O/P EST MOD 30 MIN: CPT | Performed by: INTERNAL MEDICINE

## 2024-06-10 RX ORDER — GABAPENTIN 100 MG/1
200 CAPSULE ORAL 3 TIMES DAILY
Qty: 540 CAPSULE | Refills: 1 | Status: SHIPPED | OUTPATIENT
Start: 2024-06-10

## 2024-06-10 NOTE — PROGRESS NOTES
Ambulatory Visit  Name: Jacob Brady      : 1945      MRN: [id number]  Encounter Provider: Lea Reyes, MD  Encounter Date: 6/10/2024   [facility]    Assessment & Plan   1. Chronic pain of right knee  -     gabapentin (Neurontin) 100 mg capsule; Take 2 capsules (200 mg total) by mouth 3 (three) times a day  2. Mixed hyperlipidemia  -     CBC and differential; Future; Expected date: 12/10/2024  -     Comprehensive metabolic panel; Future; Expected date: 12/10/2024  -     Lipid panel; Future; Expected date: 12/10/2024  3. Stage 3a chronic kidney disease (HCC)  Agrees to trying gabapentin again at a higher dose for chronic pain in the right shin under the knee  Schedule colonoscopy         History of Present Illness     Persistent pain and tenderness under the right knee-used to be on the outer portion now on the medial side  XR, dopplers, US unremarkable  Using support stockings, knee brace  Hurts when she wakes up in the morning, walks  Taking diclofenac orally and topically      Review of Systems   Respiratory:  Negative for shortness of breath.    Cardiovascular:  Negative for chest pain, palpitations and leg swelling.   Gastrointestinal:  Negative for abdominal pain.     Past Medical History:   Diagnosis Date   • Breast cancer (HCC)     B/L   • Cancer (HCC)    • Chronic low back pain    • Elevated blood pressure reading without diagnosis of hypertension 2022   • Endometrial cancer (HCC)    • PONV (postoperative nausea and vomiting)    • Uterine cancer (HCC) 2012     Past Surgical History:   Procedure Laterality Date   • BREAST LUMPECTOMY Left    • BREAST RECONSTRUCTION Right    • COLONOSCOPY     • JOINT REPLACEMENT Bilateral    • KNEE ARTHROSCOPY Bilateral    • MASTECTOMY Right        • ORTHOPEDIC SURGERY Bilateral     knee arthoscopy   • FL LAPAROSCOPY SURG CHOLECYSTECTOMY N/A 2/15/2019    Procedure: CHOLECYSTECTOMY LAPAROSCOPIC;  Surgeon:  Ayo Hernandez MD;  Location: [facility];  Service: General   • RADICAL ABDOMINAL HYSTERECTOMY     • REDUCTION MAMMAPLASTY Left      Family History   Problem Relation Age of Onset   • Stroke Mother    • Hypertension Mother    • Cancer Father      Social History     Tobacco Use   • Smoking status: Never     Passive exposure: Never   • Smokeless tobacco: Never   Vaping Use   • Vaping status: Never Used   Substance and Sexual Activity   • Alcohol use: No   • Drug use: No   • Sexual activity: Never     Current Outpatient Medications on File Prior to Visit   Medication Sig   • ALPRAZolam (XANAX) 0.25 mg tablet Take 1 tablet (0.25 mg total) by mouth daily as needed for anxiety   • atorvastatin (LIPITOR) 10 mg tablet Take 1 tablet (10 mg total) by mouth daily   • Calcium Carbonate-Vitamin D 600-400 MG-UNIT per tablet Take 1 tablet by mouth 2 (two) times a day    • celecoxib (CeleBREX) 50 MG capsule Take 1 capsule (50 mg total) by mouth 2 (two) times a day as needed for mild pain   • Diclofenac Sodium 3 % GEL Apply 2 g topically 2 (two) times a day as needed (pain) GI intolerance with ASA Unable to take nsaids due to age   • nitroglycerin (RECTIV) 0.4 % Insert into the rectum every 12 (twelve) hours (Patient not taking: Reported on 4/25/2023)     Allergies   Allergen Reactions   • Latex Itching and Rash     If wearing latex bandages   • Penicillin V Rash   • Other Other (See Comments)     rash   • Aspirin GI Intolerance   • Citrus - Food Allergy Itching and Rash     ACIDIC FOODS   • Oxycodone-Acetaminophen GI Intolerance and Headache   • Wound Dressings Itching     Red and blisters     Immunization History   Administered Date(s) Administered   • COVID-19 PFIZER VACCINE 0.3 ML IM 02/19/2021, 03/12/2021, 08/30/2021   • INFLUENZA 11/18/2022   • Influenza Split High Dose Preservative Free IM 11/01/2014   • Influenza, Seasonal Vaccine, Quadrivalent, Adjuvanted, .5e 10/30/2020   • Influenza, high dose seasonal 0.7 mL 02/10/2020,  12/28/2021, 11/10/2023   • Pneumococcal Conjugate 13-Valent 07/15/2019   • Pneumococcal Polysaccharide PPV23 11/01/2014     Objective     /82   Pulse 75   Wt 87.1 kg (192 lb)   SpO2 95%   BMI 33.16 kg/m²     Physical Exam  Constitutional:       Appearance: She is not ill-appearing.   Cardiovascular:      Rate and Rhythm: Regular rhythm.      Heart sounds: Normal heart sounds.   Pulmonary:      Breath sounds: Normal breath sounds.   Abdominal:      Palpations: Abdomen is soft.      Tenderness: There is no abdominal tenderness.   Musculoskeletal:      Right lower leg: No edema.      Left lower leg: No edema.        Legs:        Administrative Statements

## 2024-12-18 ENCOUNTER — RA CDI HCC (OUTPATIENT)
Dept: OTHER | Facility: HOSPITAL | Age: 79
End: 2024-12-18

## 2024-12-26 ENCOUNTER — OFFICE VISIT (OUTPATIENT)
Dept: INTERNAL MEDICINE CLINIC | Facility: CLINIC | Age: 79
End: 2024-12-26
Payer: COMMERCIAL

## 2024-12-26 VITALS
HEART RATE: 72 BPM | BODY MASS INDEX: 32.58 KG/M2 | DIASTOLIC BLOOD PRESSURE: 72 MMHG | WEIGHT: 190.8 LBS | HEIGHT: 64 IN | SYSTOLIC BLOOD PRESSURE: 132 MMHG | OXYGEN SATURATION: 93 % | TEMPERATURE: 97.2 F

## 2024-12-26 DIAGNOSIS — E63.9 NUTRITIONAL DEFICIENCY: ICD-10-CM

## 2024-12-26 DIAGNOSIS — N95.9 UNSPECIFIED MENOPAUSAL AND PERIMENOPAUSAL DISORDER: ICD-10-CM

## 2024-12-26 DIAGNOSIS — E55.9 VITAMIN D DEFICIENCY: ICD-10-CM

## 2024-12-26 DIAGNOSIS — Z00.00 MEDICARE ANNUAL WELLNESS VISIT, SUBSEQUENT: ICD-10-CM

## 2024-12-26 DIAGNOSIS — E78.2 MIXED HYPERLIPIDEMIA: ICD-10-CM

## 2024-12-26 DIAGNOSIS — Z13.820 SCREENING FOR OSTEOPOROSIS: ICD-10-CM

## 2024-12-26 DIAGNOSIS — M79.604 RIGHT LEG PAIN: ICD-10-CM

## 2024-12-26 DIAGNOSIS — E46 PROTEIN-CALORIE MALNUTRITION, UNSPECIFIED SEVERITY (HCC): ICD-10-CM

## 2024-12-26 DIAGNOSIS — H25.9 AGE-RELATED CATARACT OF BOTH EYES, UNSPECIFIED AGE-RELATED CATARACT TYPE: ICD-10-CM

## 2024-12-26 DIAGNOSIS — Z12.11 COLON CANCER SCREENING: ICD-10-CM

## 2024-12-26 DIAGNOSIS — R42 DIZZINESS: Primary | ICD-10-CM

## 2024-12-26 PROCEDURE — G0439 PPPS, SUBSEQ VISIT: HCPCS | Performed by: INTERNAL MEDICINE

## 2024-12-26 PROCEDURE — 99214 OFFICE O/P EST MOD 30 MIN: CPT | Performed by: INTERNAL MEDICINE

## 2024-12-26 NOTE — PROGRESS NOTES
Name: Jacob Brady      : 1945      MRN: [id number]  Encounter Provider: Lea Reyes, MD  Encounter Date: 2024   Enc[facility] department: [facility]    Assessment & Plan  Dizziness  She describes orthostatic hypotension but blood pressure stayed 120/80 supine and sitting  Stays well-hydrated  ?  Medication side effect-gabapentin so discontinued since it has not relieved any of her knee pain  Compression stockings might also help    Orders:  •  CBC and differential  •  Comprehensive metabolic panel    Medicare annual wellness visit, subsequent         Age-related cataract of both eyes, unspecified age-related cataract type  Anticipating cataract surgery in the next few months  She will be seeing Dr. Alvarado       Mixed hyperlipidemia    Orders:  •  Lipid panel  •  TSH, 3rd generation with Free T4 reflex    Vitamin D deficiency  Recommended to start multivitamin  Orders:  •  Vitamin D 25 hydroxy    Nutritional deficiency  Recommended to start multivitamin  Increase protein intake  Orders:  •  Vitamin D 25 hydroxy  •  Vitamin B12    Protein-calorie malnutrition, unspecified severity (HCC)    Orders:  •  Vitamin B12    Screening for osteoporosis    Orders:  •  DXA bone density spine hip and pelvis; Future    Unspecified menopausal and perimenopausal disorder    Orders:  •  DXA bone density spine hip and pelvis; Future    Colon cancer screening  She was scheduled for colonoscopy but had to cancel  She will reschedule       Right leg pain  Chronic pain under the right knee sometimes with swelling  Suspect this is related to her varicosities so compression stockings indicated  X-ray shows intact hardware, unremarkable venous Doppler and soft tissue ultrasound  She saw a message in [employer] about seeing a specialist for this but not sure if it will be Ortho or physical therapy.  She will let me know          Preventive health issues were discussed with patient, and age appropriate  screening tests were ordered as noted in patient's After Visit Summary. Personalized health advice and appropriate referrals for health education or preventive services given if needed, as noted in patient's After Visit Summary.    History of Present Illness     Here with her daughter who is concerned about her general nutrition  She is tired dizzy  Describes dizziness as lightheadedness when she is walking, 1 time getting out of the car  Diet is may be lacking in nutrition.  She eats a bagel and has coffee in the morning, mostly salads for other meals.  Stops eating at 5 PM.  Diagnosed with cataracts and will be seeing ophthalmology       Patient Care Team:  Lea Reyes, MD as PCP - General  DO Maria Elena Glez PA-C (Vascular Surgery)  Sebastian Lopez MD (Vascular Surgery)    Review of Systems   Constitutional:  Positive for fatigue.   Eyes:  Positive for visual disturbance.   Respiratory:  Negative for shortness of breath.    Cardiovascular:  Positive for leg swelling (under the right knee). Negative for chest pain and palpitations.   Musculoskeletal:  Positive for arthralgias (Under the right knee).   Neurological:  Positive for light-headedness (with walking for a month and a half).     Medical History Reviewed by provider this encounter:  Tobacco  Allergies  Meds  Problems  Med Hx  Surg Hx  Fam Hx       Annual Wellness Visit Questionnaire   Jacob is here for her Subsequent Wellness visit.     Health Risk Assessment:   Patient rates overall health as good. Patient feels that their physical health rating is slightly worse. Patient is very satisfied with their life. Eyesight was rated as slightly worse. Hearing was rated as same. Patient feels that their emotional and mental health rating is same. Patients states they are never, rarely angry. Patient states they are often unusually tired/fatigued. Pain experienced in the last 7 days has been none. Patient states that she has  experienced no weight loss or gain in last 6 months.     Depression Screening:   PHQ-2 Score: 0      Fall Risk Screening:   In the past year, patient has experienced: no history of falling in past year      Urinary Incontinence Screening:   Patient has not leaked urine accidently in the last six months.     Home Safety:  Patient has trouble with stairs inside or outside of their home. Patient has working smoke alarms and has working carbon monoxide detector. Home safety hazards include: none.     Nutrition:   Current diet is Regular.     Medications:   Patient is not currently taking any over-the-counter supplements. Patient is not able to manage medications.     Activities of Daily Living (ADLs)/Instrumental Activities of Daily Living (IADLs):   Walk and transfer into and out of bed and chair?: Yes  Dress and groom yourself?: Yes    Bathe or shower yourself?: Yes    Feed yourself? Yes  Do your laundry/housekeeping?: No  Manage your money, pay your bills and track your expenses?: Yes  Make your own meals?: Yes    Do your own shopping?: No    Previous Hospitalizations:   Any hospitalizations or ED visits within the last 12 months?: No      Advance Care Planning:   Living will: Yes    Durable POA for healthcare: Yes    Advanced directive: Yes      Cognitive Screening:   Provider or family/friend/caregiver concerned regarding cognition?: No    PREVENTIVE SCREENINGS      Cardiovascular Screening:    General: Screening Not Indicated and History Lipid Disorder    Due for: Lipid Panel      Diabetes Screening:       Due for: Blood Glucose      Colorectal Cancer Screening:     General: Risks and Benefits Discussed      Breast Cancer Screening:     General: Screening Not Indicated and History Breast Cancer      Cervical Cancer Screening:    General: Screening Not Indicated      Abdominal Aortic Aneurysm (AAA) Screening:        General: Screening Not Indicated      Lung Cancer Screening:     General: Screening Not  "Indicated      Hepatitis C Screening:    General: Screening Not Indicated    Social Drivers of Health     Financial Resource Strain: Medium Risk (11/3/2023)    Overall Financial Resource Strain (CARDIA)    • Difficulty of Paying Living Expenses: Somewhat hard   Food Insecurity: No Food Insecurity (12/26/2024)    Hunger Vital Sign    • Worried About Running Out of Food in the Last Year: Never true    • Ran Out of Food in the Last Year: Never true   Transportation Needs: No Transportation Needs (12/26/2024)    PRAPARE - Transportation    • Lack of Transportation (Medical): No    • Lack of Transportation (Non-Medical): No   Housing Stability: Low Risk  (12/26/2024)    Housing Stability Vital Sign    • Unable to Pay for Housing in the Last Year: No    • Number of Times Moved in the Last Year: 0    • Homeless in the Last Year: No   Utilities: Not At Risk (12/26/2024)    [facility] Utilities    • Threatened with loss of utilities: No     No results found.    Objective   /72 (BP Location: Left arm, Patient Position: Sitting, Cuff Size: Large)   Pulse 72   Temp (!) 97.2 °F (36.2 °C) (Tympanic)   Ht 5' 3.8\" (1.621 m)   Wt 86.5 kg (190 lb 12.8 oz)   SpO2 93%   BMI 32.96 kg/m²     Physical Exam  Constitutional:       General: She is not in acute distress.     Appearance: She is well-developed. She is not ill-appearing, toxic-appearing or diaphoretic.   Eyes:      Conjunctiva/sclera: Conjunctivae normal.   Cardiovascular:      Rate and Rhythm: Normal rate and regular rhythm.      Heart sounds: Normal heart sounds. No murmur heard.  Pulmonary:      Effort: Pulmonary effort is normal. No respiratory distress.      Breath sounds: Normal breath sounds. No stridor. No wheezing or rales.   Abdominal:      General: There is no distension.      Palpations: Abdomen is soft. There is no mass.      Tenderness: There is no abdominal tenderness. There is no guarding or rebound.   Musculoskeletal:      Right lower leg: No edema.      " Left lower leg: No edema.   Neurological:      Mental Status: She is alert and oriented to person, place, and time.   Psychiatric:         Mood and Affect: Mood normal.         Behavior: Behavior normal.         Thought Content: Thought content normal.         Judgment: Judgment normal.

## 2024-12-26 NOTE — PATIENT INSTRUCTIONS
Medicare Preventive Visit Patient Instructions  Thank you for completing your Welcome to Medicare Visit or Medicare Annual Wellness Visit today. Your next wellness visit will be due in one year (12/27/2025).  The screening/preventive services that you may require over the next 5-10 years are detailed below. Some tests may not apply to you based off risk factors and/or age. Screening tests ordered at today's visit but not completed yet may show as past due. Also, please note that scanned in results may not display below.  Preventive Screenings:  Service Recommendations Previous Testing/Comments   Colorectal Cancer Screening  * Colonoscopy    * Fecal Occult Blood Test (FOBT)/Fecal Immunochemical Test (FIT)  * Fecal DNA/Cologuard Test  * Flexible Sigmoidoscopy Age: 45-75 years old   Colonoscopy: every 10 years (may be performed more frequently if at higher risk)  OR  FOBT/FIT: every 1 year  OR  Cologuard: every 3 years  OR  Sigmoidoscopy: every 5 years  Screening may be recommended earlier than age 45 if at higher risk for colorectal cancer. Also, an individualized decision between you and your healthcare provider will decide whether screening between the ages of 76-85 would be appropriate. Colonoscopy: 02/07/2018  FOBT/FIT: Not on file  Cologuard: Not on file  Sigmoidoscopy: Not on file          Breast Cancer Screening Age: 40+ years old  Frequency: every 1-2 years  Not required if history of left and right mastectomy Mammogram: 05/25/2022    Screening Not Indicated  History Breast Cancer   Cervical Cancer Screening Between the ages of 21-29, pap smear recommended once every 3 years.   Between the ages of 30-65, can perform pap smear with HPV co-testing every 5 years.   Recommendations may differ for women with a history of total hysterectomy, cervical cancer, or abnormal pap smears in past. Pap Smear: Not on file    Screening Not Indicated   Hepatitis C Screening Once for adults born between 1945 and 1965  More  frequently in patients at high risk for Hepatitis C Hep C Antibody: Not on file        Diabetes Screening 1-2 times per year if you're at risk for diabetes or have pre-diabetes Fasting glucose: 86 mg/dL (11/30/2023)  A1C: No results in last 5 years (No results in last 5 years)      Cholesterol Screening Once every 5 years if you don't have a lipid disorder. May order more often based on risk factors. Lipid panel: 11/30/2023    Screening Not Indicated  History Lipid Disorder     Other Preventive Screenings Covered by Medicare:  Abdominal Aortic Aneurysm (AAA) Screening: covered once if your at risk. You're considered to be at risk if you have a family history of AAA.  Lung Cancer Screening: covers low dose CT scan once per year if you meet all of the following conditions: (1) Age 55-77; (2) No signs or symptoms of lung cancer; (3) Current smoker or have quit smoking within the last 15 years; (4) You have a tobacco smoking history of at least 20 pack years (packs per day multiplied by number of years you smoked); (5) You get a written order from a healthcare provider.  Glaucoma Screening: covered annually if you're considered high risk: (1) You have diabetes OR (2) Family history of glaucoma OR (3)  aged 50 and older OR (4)  American aged 65 and older  Osteoporosis Screening: covered every 2 years if you meet one of the following conditions: (1) You're estrogen deficient and at risk for osteoporosis based off medical history and other findings; (2) Have a vertebral abnormality; (3) On glucocorticoid therapy for more than 3 months; (4) Have primary hyperparathyroidism; (5) On osteoporosis medications and need to assess response to drug therapy.   Last bone density test (DXA Scan): 01/16/2020.  HIV Screening: covered annually if you're between the age of 15-65. Also covered annually if you are younger than 15 and older than 65 with risk factors for HIV infection. For pregnant patients, it is  covered up to 3 times per pregnancy.    Immunizations:  Immunization Recommendations   Influenza Vaccine Annual influenza vaccination during flu season is recommended for all persons aged >= 6 months who do not have contraindications   Pneumococcal Vaccine   * Pneumococcal conjugate vaccine = PCV13 (Prevnar 13), PCV15 (Vaxneuvance), PCV20 (Prevnar 20)  * Pneumococcal polysaccharide vaccine = PPSV23 (Pneumovax) Adults 19-65 yo with certain risk factors or if 65+ yo  If never received any pneumonia vaccine: recommend Prevnar 20 (PCV20)  Give PCV20 if previously received 1 dose of PCV13 or PPSV23   Hepatitis B Vaccine 3 dose series if at intermediate or high risk (ex: diabetes, end stage renal disease, liver disease)   Respiratory syncytial virus (RSV) Vaccine - COVERED BY MEDICARE PART D  * RSVPreF3 (Arexvy) CDC recommends that adults 60 years of age and older may receive a single dose of RSV vaccine using shared clinical decision-making (SCDM)   Tetanus (Td) Vaccine - COST NOT COVERED BY MEDICARE PART B Following completion of primary series, a booster dose should be given every 10 years to maintain immunity against tetanus. Td may also be given as tetanus wound prophylaxis.   Tdap Vaccine - COST NOT COVERED BY MEDICARE PART B Recommended at least once for all adults. For pregnant patients, recommended with each pregnancy.   Shingles Vaccine (Shingrix) - COST NOT COVERED BY MEDICARE PART B  2 shot series recommended in those 19 years and older who have or will have weakened immune systems or those 50 years and older     Health Maintenance Due:      Topic Date Due   • Colorectal Cancer Screening  02/07/2023     Immunizations Due:      Topic Date Due   • Influenza Vaccine (1) 09/01/2024   • COVID-19 Vaccine (4 - 2024-25 season) 09/01/2024     Advance Directives   What are advance directives?  Advance directives are legal documents that state your wishes and plans for medical care. These plans are made ahead of time in  case you lose your ability to make decisions for yourself. Advance directives can apply to any medical decision, such as the treatments you want, and if you want to donate organs.   What are the types of advance directives?  There are many types of advance directives, and each state has rules about how to use them. You may choose a combination of any of the following:  Living will:  This is a written record of the treatment you want. You can also choose which treatments you do not want, which to limit, and which to stop at a certain time. This includes surgery, medicine, IV fluid, and tube feedings.   Durable power of  for healthcare (DPAHC):  This is a written record that states who you want to make healthcare choices for you when you are unable to make them for yourself. This person, called a proxy, is usually a family member or a friend. You may choose more than 1 proxy.  Do not resuscitate (DNR) order:  A DNR order is used in case your heart stops beating or you stop breathing. It is a request not to have certain forms of treatment, such as CPR. A DNR order may be included in other types of advance directives.  Medical directive:  This covers the care that you want if you are in a coma, near death, or unable to make decisions for yourself. You can list the treatments you want for each condition. Treatment may include pain medicine, surgery, blood transfusions, dialysis, IV or tube feedings, and a ventilator (breathing machine).  Values history:  This document has questions about your views, beliefs, and how you feel and think about life. This information can help others choose the care that you would choose.  Why are advance directives important?  An advance directive helps you control your care. Although spoken wishes may be used, it is better to have your wishes written down. Spoken wishes can be misunderstood, or not followed. Treatments may be given even if you do not want them. An advance directive  may make it easier for your family to make difficult choices about your care.   Weight Management   Why it is important to manage your weight:  Being overweight increases your risk of health conditions such as heart disease, high blood pressure, type 2 diabetes, and certain types of cancer. It can also increase your risk for osteoarthritis, sleep apnea, and other respiratory problems. Aim for a slow, steady weight loss. Even a small amount of weight loss can lower your risk of health problems.  How to lose weight safely:  A safe and healthy way to lose weight is to eat fewer calories and get regular exercise. You can lose up about 1 pound a week by decreasing the number of calories you eat by 500 calories each day.   Healthy meal plan for weight management:  A healthy meal plan includes a variety of foods, contains fewer calories, and helps you stay healthy. A healthy meal plan includes the following:  Eat whole-grain foods more often.  A healthy meal plan should contain fiber. Fiber is the part of grains, fruits, and vegetables that is not broken down by your body. Whole-grain foods are healthy and provide extra fiber in your diet. Some examples of whole-grain foods are whole-wheat breads and pastas, oatmeal, brown rice, and bulgur.  Eat a variety of vegetables every day.  Include dark, leafy greens such as spinach, kale, belkys greens, and mustard greens. Eat yellow and orange vegetables such as carrots, sweet potatoes, and winter squash.   Eat a variety of fruits every day.  Choose fresh or canned fruit (canned in its own juice or light syrup) instead of juice. Fruit juice has very little or no fiber.  Eat low-fat dairy foods.  Drink fat-free (skim) milk or 1% milk. Eat fat-free yogurt and low-fat cottage cheese. Try low-fat cheeses such as mozzarella and other reduced-fat cheeses.  Choose meat and other protein foods that are low in fat.  Choose beans or other legumes such as split peas or lentils. Choose fish,  skinless poultry (chicken or turkey), or lean cuts of red meat (beef or pork). Before you cook meat or poultry, cut off any visible fat.   Use less fat and oil.  Try baking foods instead of frying them. Add less fat, such as margarine, sour cream, regular salad dressing and mayonnaise to foods. Eat fewer high-fat foods. Some examples of high-fat foods include french fries, doughnuts, ice cream, and cakes.  Eat fewer sweets.  Limit foods and drinks that are high in sugar. This includes candy, cookies, regular soda, and sweetened drinks.  Exercise:  Exercise at least 30 minutes per day on most days of the week. Some examples of exercise include walking, biking, dancing, and swimming. You can also fit in more physical activity by taking the stairs instead of the elevator or parking farther away from stores. Ask your healthcare provider about the best exercise plan for you.      © Copyright [employer] 2018 Information is for End User's use only and may not be sold, redistributed or otherwise used for commercial purposes. All illustrations and images included in CareNotes® are the copyrighted property of [employer].A[employer]M., Inc. or [employer]

## 2024-12-30 ENCOUNTER — RESULTS FOLLOW-UP (OUTPATIENT)
Dept: INTERNAL MEDICINE CLINIC | Facility: CLINIC | Age: 79
End: 2024-12-30

## 2024-12-30 ENCOUNTER — APPOINTMENT (OUTPATIENT)
Dept: LAB | Age: 79
End: 2024-12-30
Payer: COMMERCIAL

## 2024-12-30 LAB
25(OH)D3 SERPL-MCNC: 19 NG/ML (ref 30–100)
ALBUMIN SERPL BCG-MCNC: 4.1 G/DL (ref 3.5–5)
ALP SERPL-CCNC: 65 U/L (ref 34–104)
ALT SERPL W P-5'-P-CCNC: 13 U/L (ref 7–52)
ANION GAP SERPL CALCULATED.3IONS-SCNC: 7 MMOL/L (ref 4–13)
AST SERPL W P-5'-P-CCNC: 13 U/L (ref 13–39)
BASOPHILS # BLD AUTO: 0.04 THOUSANDS/ΜL (ref 0–0.1)
BASOPHILS NFR BLD AUTO: 0 % (ref 0–1)
BILIRUB SERPL-MCNC: 1.14 MG/DL (ref 0.2–1)
BUN SERPL-MCNC: 13 MG/DL (ref 5–25)
CALCIUM SERPL-MCNC: 9.4 MG/DL (ref 8.4–10.2)
CHLORIDE SERPL-SCNC: 104 MMOL/L (ref 96–108)
CHOLEST SERPL-MCNC: 240 MG/DL (ref ?–200)
CO2 SERPL-SCNC: 27 MMOL/L (ref 21–32)
CREAT SERPL-MCNC: 1 MG/DL (ref 0.6–1.3)
EOSINOPHIL # BLD AUTO: 0.12 THOUSAND/ΜL (ref 0–0.61)
EOSINOPHIL NFR BLD AUTO: 1 % (ref 0–6)
ERYTHROCYTE [DISTWIDTH] IN BLOOD BY AUTOMATED COUNT: 13.6 % (ref 11.6–15.1)
GFR SERPL CREATININE-BSD FRML MDRD: 53 ML/MIN/1.73SQ M
GLUCOSE P FAST SERPL-MCNC: 93 MG/DL (ref 65–99)
HCT VFR BLD AUTO: 41.2 % (ref 34.8–46.1)
HDLC SERPL-MCNC: 52 MG/DL
HGB BLD-MCNC: 13.8 G/DL (ref 11.5–15.4)
IMM GRANULOCYTES # BLD AUTO: 0.05 THOUSAND/UL (ref 0–0.2)
IMM GRANULOCYTES NFR BLD AUTO: 1 % (ref 0–2)
LDLC SERPL CALC-MCNC: 167 MG/DL (ref 0–100)
LYMPHOCYTES # BLD AUTO: 1.02 THOUSANDS/ΜL (ref 0.6–4.47)
LYMPHOCYTES NFR BLD AUTO: 11 % (ref 14–44)
MCH RBC QN AUTO: 29.2 PG (ref 26.8–34.3)
MCHC RBC AUTO-ENTMCNC: 33.5 G/DL (ref 31.4–37.4)
MCV RBC AUTO: 87 FL (ref 82–98)
MONOCYTES # BLD AUTO: 0.75 THOUSAND/ΜL (ref 0.17–1.22)
MONOCYTES NFR BLD AUTO: 8 % (ref 4–12)
NEUTROPHILS # BLD AUTO: 6.98 THOUSANDS/ΜL (ref 1.85–7.62)
NEUTS SEG NFR BLD AUTO: 79 % (ref 43–75)
NONHDLC SERPL-MCNC: 188 MG/DL
NRBC BLD AUTO-RTO: 0 /100 WBCS
PLATELET # BLD AUTO: 195 THOUSANDS/UL (ref 149–390)
PMV BLD AUTO: 11.8 FL (ref 8.9–12.7)
POTASSIUM SERPL-SCNC: 4 MMOL/L (ref 3.5–5.3)
PROT SERPL-MCNC: 6.9 G/DL (ref 6.4–8.4)
RBC # BLD AUTO: 4.73 MILLION/UL (ref 3.81–5.12)
SODIUM SERPL-SCNC: 138 MMOL/L (ref 135–147)
TRIGL SERPL-MCNC: 103 MG/DL (ref ?–150)
TSH SERPL DL<=0.05 MIU/L-ACNC: 1.9 UIU/ML (ref 0.45–4.5)
VIT B12 SERPL-MCNC: 259 PG/ML (ref 180–914)
WBC # BLD AUTO: 8.96 THOUSAND/UL (ref 4.31–10.16)

## 2024-12-30 PROCEDURE — 82306 VITAMIN D 25 HYDROXY: CPT | Performed by: INTERNAL MEDICINE

## 2024-12-30 PROCEDURE — 80053 COMPREHEN METABOLIC PANEL: CPT | Performed by: INTERNAL MEDICINE

## 2024-12-30 PROCEDURE — 36415 COLL VENOUS BLD VENIPUNCTURE: CPT | Performed by: INTERNAL MEDICINE

## 2024-12-30 PROCEDURE — 80061 LIPID PANEL: CPT | Performed by: INTERNAL MEDICINE

## 2024-12-30 PROCEDURE — 84443 ASSAY THYROID STIM HORMONE: CPT | Performed by: INTERNAL MEDICINE

## 2024-12-30 PROCEDURE — 82607 VITAMIN B-12: CPT | Performed by: INTERNAL MEDICINE

## 2024-12-30 PROCEDURE — 85025 COMPLETE CBC W/AUTO DIFF WBC: CPT | Performed by: INTERNAL MEDICINE

## 2024-12-31 DIAGNOSIS — E78.2 MIXED HYPERLIPIDEMIA: ICD-10-CM

## 2024-12-31 RX ORDER — ATORVASTATIN CALCIUM 10 MG/1
10 TABLET, FILM COATED ORAL DAILY
Qty: 90 TABLET | Refills: 0 | Status: SHIPPED | OUTPATIENT
Start: 2024-12-31

## 2025-03-25 ENCOUNTER — RA CDI HCC (OUTPATIENT)
Dept: OTHER | Facility: HOSPITAL | Age: 80
End: 2025-03-25

## 2025-04-01 ENCOUNTER — OFFICE VISIT (OUTPATIENT)
Dept: INTERNAL MEDICINE CLINIC | Facility: CLINIC | Age: 80
End: 2025-04-01
Payer: COMMERCIAL

## 2025-04-01 VITALS
TEMPERATURE: 96.6 F | WEIGHT: 188.4 LBS | BODY MASS INDEX: 33.38 KG/M2 | HEART RATE: 90 BPM | SYSTOLIC BLOOD PRESSURE: 122 MMHG | HEIGHT: 63 IN | OXYGEN SATURATION: 96 % | DIASTOLIC BLOOD PRESSURE: 70 MMHG

## 2025-04-01 DIAGNOSIS — N18.31 STAGE 3A CHRONIC KIDNEY DISEASE (HCC): ICD-10-CM

## 2025-04-01 DIAGNOSIS — Z01.818 PREOP EXAM FOR INTERNAL MEDICINE: Primary | ICD-10-CM

## 2025-04-01 DIAGNOSIS — E78.2 MIXED HYPERLIPIDEMIA: ICD-10-CM

## 2025-04-01 DIAGNOSIS — H25.9 AGE-RELATED CATARACT OF BOTH EYES, UNSPECIFIED AGE-RELATED CATARACT TYPE: ICD-10-CM

## 2025-04-01 DIAGNOSIS — E53.8 B12 DEFICIENCY: ICD-10-CM

## 2025-04-01 DIAGNOSIS — E55.9 VITAMIN D DEFICIENCY: ICD-10-CM

## 2025-04-01 DIAGNOSIS — C50.912 BREAST CANCER, STAGE 1, LEFT (HCC): ICD-10-CM

## 2025-04-01 PROCEDURE — 99214 OFFICE O/P EST MOD 30 MIN: CPT | Performed by: INTERNAL MEDICINE

## 2025-04-01 PROCEDURE — G2211 COMPLEX E/M VISIT ADD ON: HCPCS | Performed by: INTERNAL MEDICINE

## 2025-04-01 RX ORDER — POLYMYXIN B SULFATE AND TRIMETHOPRIM 1; 10000 MG/ML; [USP'U]/ML
SOLUTION OPHTHALMIC
COMMUNITY
Start: 2025-03-10

## 2025-04-01 RX ORDER — PREDNISOLONE ACETATE 10 MG/ML
SUSPENSION/ DROPS OPHTHALMIC
COMMUNITY
Start: 2025-03-10

## 2025-04-01 NOTE — PATIENT INSTRUCTIONS
Pre-operative Medication Instructions    Avoid herbs or non-directed vitamins one week prior to surgery  Avoid aspirin containing medications or non-steroidal anti-inflammatory drugs one week preceding surgery  May take tylenol for pain up until the night before surgery    Benzodiazepine Antagonist     Medication Name     ALPRAZolam (XANAX) 0.25 mg tablet      If this medication is needed please continue to take on your normal schedule.  If you take it in the morning, then you may take this medicine with a sip of water.    Cholesterol lowering meds     Medication Name     atorvastatin (LIPITOR) 10 mg tablet      Continue to take this medication on your normal schedule.  If this is an oral medication and you take it in the morning, then you may take this medicine with a sip of water.    Nitroglycerin     Medication Name     nitroglycerin (RECTIV) 0.4 %      Continue to take your nitroglyerin as directed by your prescribing Physician and notify your Physician and Anesthesiologist if you have needed to increase the frequency or dosage.

## 2025-04-01 NOTE — ASSESSMENT & PLAN NOTE
Lab Results   Component Value Date    EGFR 53 12/30/2024    EGFR 57 11/30/2023    EGFR 49 11/29/2022    CREATININE 1.00 12/30/2024    CREATININE 0.94 11/30/2023    CREATININE 1.08 11/29/2022   Stable kidney function

## 2025-04-01 NOTE — PROGRESS NOTES
Pre-operative Clearance  Name: Jacob Brady      : 1945      MRN: [id number]  Encounter Provider: Lea Reyes, MD  Encounter Date: 2025   [facility]    Assessment & Plan  Preop exam for internal medicine  Low risk for low risk procedure  Medically stable for planned cataract surgeries  Orders:  •  POCT ECG    Age-related cataract of both eyes, unspecified age-related cataract type    Orders:  •  POCT ECG    Breast cancer, stage 1, left (HCC)         Stage 3a chronic kidney disease (HCC)  Lab Results   Component Value Date    EGFR 53 2024    EGFR 57 2023    EGFR 49 2022    CREATININE 1.00 2024    CREATININE 0.94 2023    CREATININE 1.08 2022   Stable kidney function       Mixed hyperlipidemia  Regularly taking atorvastatin at this point  Orders:  •  CBC and differential; Future  •  Comprehensive metabolic panel; Future  •  Lipid panel; Future    Vitamin D deficiency    Orders:  •  Vitamin D 25 hydroxy; Future    B12 deficiency    Orders:  •  Vitamin B12; Future    Pre-operative Clearance:     Revised Cardiac Risk Index:  RCI RISK CLASS I (0 risk factors, risk of major cardiac complications approximately 0.5%)    Medication Instructions:   - Benzodiazepines (ie, alprazolam, lorazepam, diazepam):  If the medication is needed, continue to take it on your normal schedule.  - Hyperlipidemia meds: Continue to take this medication on your normal schedule.  - Nitroglycerin: Continue to take this medication on your normal schedule.       History of Present Illness     Pre-op Exam  Surgery: Cataract OS then OD  Anticipated Date of Surgery: ,   Surgeon: Dr Lozada    Previous history of bleeding disorders or clots?: Yes  Previous Anesthesia reaction?: Yes  Prolonged steroid use in the last 6 months?: No    Assessment of Cardiac Risk:   - Unstable or severe angina or MI in the last 6 weeks or history of stent placement in the  last year?: No   - Decompensated heart failure (e.g. New onset heart failure, NYHA  Class IV heart failure, or worsening existing heart failure)?: No  - Significant arrhythmias such as high grade AV block, symptomatic ventricular arrhythmia, newly recognized ventricular tachycardia, supraventricular tachycardia with resting heart rate >100, or symptomatic bradycardia?: No  - Severe heart valve disease including aortic stenosis or symptomatic mitral stenosis?: No      Pre-operative Risk Factors:  Elevated-risk surgery: No    History of cerebrovascular disease: No    History of ischemic heart disease: No  Pre-operative treatment with insulin: No  Pre-operative creatinine >2 mg/dL: No    History of congestive heart failure: No    Review of Systems   Constitutional:  Negative for unexpected weight change.   Eyes:  Positive for visual disturbance.   Respiratory:  Negative for shortness of breath.    Cardiovascular:  Negative for chest pain and palpitations.   Gastrointestinal:  Negative for abdominal pain, constipation and diarrhea.   Genitourinary:  Negative for difficulty urinating and dysuria.   Neurological:  Negative for dizziness and headaches.     Past Medical History   Past Medical History:   Diagnosis Date   • Breast cancer (HCC)     B/L   • Cancer (HCC)    • Chronic low back pain    • Elevated blood pressure reading without diagnosis of hypertension 4/21/2022   • Endometrial cancer (HCC)    • PONV (postoperative nausea and vomiting)    • Uterine cancer (HCC) 11/4/2012     Past Surgical History:   Procedure Laterality Date   • BREAST LUMPECTOMY Left    • BREAST RECONSTRUCTION Right 2010   • COLONOSCOPY     • JOINT REPLACEMENT Bilateral    • KNEE ARTHROSCOPY Bilateral    • MASTECTOMY Right     2003   • ORTHOPEDIC SURGERY Bilateral     knee arthoscopy   • IN LAPAROSCOPY SURG CHOLECYSTECTOMY N/A 2/15/2019    Procedure: CHOLECYSTECTOMY LAPAROSCOPIC;  Surgeon: Ayo Hernandez MD;  Location: [facility];  Service: General    • RADICAL ABDOMINAL HYSTERECTOMY     • REDUCTION MAMMAPLASTY Left      Family History   Problem Relation Age of Onset   • Stroke Mother    • Hypertension Mother    • Deep vein thrombosis Mother    • Varicose Veins Mother    • Cancer Father      Social History     Tobacco Use   • Smoking status: Never     Passive exposure: Never   • Smokeless tobacco: Never   Vaping Use   • Vaping status: Never Used   Substance and Sexual Activity   • Alcohol use: No   • Drug use: No   • Sexual activity: Never     Current Outpatient Medications on File Prior to Visit   Medication Sig   • atorvastatin (LIPITOR) 10 mg tablet Take 1 tablet (10 mg total) by mouth daily   • Calcium Carbonate-Vitamin D 600-400 MG-UNIT per tablet Take 1 tablet by mouth 2 (two) times a day    • Diclofenac Sodium 3 % GEL Apply 2 g topically 2 (two) times a day as needed (pain) GI intolerance with ASA Unable to take nsaids due to age   • polymyxin b-trimethoprim (POLYTRIM) ophthalmic solution instill 1 drop INTO AFFECTED EYE(S) four times a day as directed ...  (REFER TO PRESCRIPTION NOTES).   • prednisoLONE acetate (PRED FORTE) 1 % ophthalmic suspension instill 1 drop INTO AFFECTED EYE(S) four times a day as directed ...  (REFER TO PRESCRIPTION NOTES).   • ALPRAZolam (XANAX) 0.25 mg tablet Take 1 tablet (0.25 mg total) by mouth daily as needed for anxiety (Patient not taking: No sig reported)   • celecoxib (CeleBREX) 50 MG capsule Take 1 capsule (50 mg total) by mouth 2 (two) times a day as needed for mild pain (Patient not taking: No sig reported)   • nitroglycerin (RECTIV) 0.4 % Insert into the rectum every 12 (twelve) hours (Patient not taking: Reported on 4/25/2023)     Allergies   Allergen Reactions   • Latex Itching and Rash     If wearing latex bandages   • Penicillin V Rash   • Other Other (See Comments)     rash   • Aspirin GI Intolerance   • Citrus - Food Allergy Itching and Rash     ACIDIC FOODS   • Oxycodone-Acetaminophen GI Intolerance and  "Headache   • Wound Dressings Itching     Red and blisters     Objective   /70   Pulse 90   Temp (!) 96.6 °F (35.9 °C) (Tympanic)   Ht 5' 3\" (1.6 m)   Wt 85.5 kg (188 lb 6.4 oz)   SpO2 96%   BMI 33.37 kg/m²     Physical Exam  Constitutional:       General: She is not in acute distress.     Appearance: She is well-developed. She is not ill-appearing, toxic-appearing or diaphoretic.   Eyes:      Conjunctiva/sclera: Conjunctivae normal.   Cardiovascular:      Rate and Rhythm: Normal rate and regular rhythm.      Heart sounds: Normal heart sounds. No murmur heard.  Pulmonary:      Effort: Pulmonary effort is normal. No respiratory distress.      Breath sounds: Normal breath sounds. No stridor. No wheezing or rales.   Abdominal:      General: There is no distension.      Palpations: Abdomen is soft. There is no mass.      Tenderness: There is no abdominal tenderness. There is no guarding or rebound.   Musculoskeletal:      Cervical back: Neck supple.      Right lower leg: No edema.      Left lower leg: No edema.   Neurological:      Mental Status: She is alert and oriented to person, place, and time.   Psychiatric:         Mood and Affect: Mood normal.         Behavior: Behavior normal.         Thought Content: Thought content normal.         Judgment: Judgment normal.           Lea Reyes, MD  "

## 2025-04-01 NOTE — ASSESSMENT & PLAN NOTE
Regularly taking atorvastatin at this point  Orders:  •  CBC and differential; Future  •  Comprehensive metabolic panel; Future  •  Lipid panel; Future

## 2025-04-03 PROCEDURE — 93000 ELECTROCARDIOGRAM COMPLETE: CPT | Performed by: INTERNAL MEDICINE

## 2025-04-22 ENCOUNTER — TELEPHONE (OUTPATIENT)
Age: 80
End: 2025-04-22

## 2025-04-22 NOTE — TELEPHONE ENCOUNTER
Patients cataract surgeries are on 5/5 & 5/19 so her clearance should have been done this week instead of on 4/1. Please call [facility] for Surgery to discuss.    Extension #[phone]

## 2025-04-23 NOTE — TELEPHONE ENCOUNTER
Spoke with [facility] for [facility] they need new note with updated date can not use 4/1 appt.  Called patient scheduled 4/24 for new pre op

## 2025-04-24 ENCOUNTER — CONSULT (OUTPATIENT)
Dept: INTERNAL MEDICINE CLINIC | Facility: CLINIC | Age: 80
End: 2025-04-24
Payer: COMMERCIAL

## 2025-04-24 VITALS
BODY MASS INDEX: 32.17 KG/M2 | SYSTOLIC BLOOD PRESSURE: 130 MMHG | OXYGEN SATURATION: 98 % | HEART RATE: 80 BPM | WEIGHT: 188.4 LBS | TEMPERATURE: 97 F | HEIGHT: 64 IN | DIASTOLIC BLOOD PRESSURE: 70 MMHG

## 2025-04-24 DIAGNOSIS — H26.9 CATARACT OF BOTH EYES, UNSPECIFIED CATARACT TYPE: ICD-10-CM

## 2025-04-24 DIAGNOSIS — Z01.818 PREOP EXAM FOR INTERNAL MEDICINE: Primary | ICD-10-CM

## 2025-04-24 PROCEDURE — 99213 OFFICE O/P EST LOW 20 MIN: CPT | Performed by: INTERNAL MEDICINE

## 2025-04-24 PROCEDURE — G2211 COMPLEX E/M VISIT ADD ON: HCPCS | Performed by: INTERNAL MEDICINE

## 2025-04-24 NOTE — PROGRESS NOTES
Pre-operative Clearance  Name: Jacob Brady      : 1945      MRN: [id number]  Encounter Provider: Lea Reyes, MD  Encounter Date: 2025   Enc[facility] department: [facility]    :  Assessment & Plan  Preop exam for internal medicine  Low risk for low risk procedure       Cataract of both eyes, unspecified cataract type             Pre-operative Clearance:     Revised Cardiac Risk Index:  RCI RISK CLASS I (0 risk factors, risk of major cardiac complications approximately 0.5%)    Clearance:  Patient is medically optimized (CLEARED) for proposed surgery without any additional cardiac testing.      Medication Instructions:   - Benzodiazepines (ie, alprazolam, lorazepam, diazepam):  If the medication is needed, continue to take it on your normal schedule.  - Hyperlipidemia meds: Continue to take this medication on your normal schedule.  - Nitroglycerin: Continue to take this medication on your normal schedule.       History of Present Illness     Pre-Op Examination     Surgery: Cataract surgeries   Anticipated Date of Surgery: Me 5 in May 19   Surgeon: Dr Lozada     Previous history of bleeding disorders or clots?: Yes    Previous Anesthesia reaction?: Yes    Prolonged steroid use in the last 6 months?: No      Assessment of Cardiac Risk:   - Unstable or severe angina or MI in the last 6 weeks or history of stent placement in the last year?: No    - Decompensated heart failure (e.g. New onset heart failure, NYHA  Class IV heart failure, or worsening existing heart failure)?: No    - Significant arrhythmias such as high grade AV block, symptomatic ventricular arrhythmia, newly recognized ventricular tachycardia, supraventricular tachycardia with resting heart rate >100, or symptomatic bradycardia?: No    - Severe heart valve disease including aortic stenosis or symptomatic mitral stenosis?: No      Pre-operative Risk Factors:  - Elevated-risk surgery: No    - History of cerebrovascular  disease: No    - History of ischemic heart disease: No    - History of congestive heart failure: No    - Pre-operative treatment with insulin: No    - Pre-operative creatinine >2 mg/dL: No      Medications of Perioperative Concern:    NSAIDs    Review of Systems   Constitutional:  Negative for fatigue and unexpected weight change.   Respiratory:  Negative for shortness of breath.    Cardiovascular:  Negative for chest pain and palpitations.   Gastrointestinal:  Negative for abdominal pain.   Neurological:  Negative for dizziness and headaches.     Past Medical History   Past Medical History:   Diagnosis Date   • Breast cancer (HCC)     B/L   • Cancer (HCC)    • Chronic low back pain    • Elevated blood pressure reading without diagnosis of hypertension 4/21/2022   • Endometrial cancer (HCC)    • PONV (postoperative nausea and vomiting)    • Uterine cancer (HCC) 11/4/2012     Past Surgical History:   Procedure Laterality Date   • BREAST LUMPECTOMY Left    • BREAST RECONSTRUCTION Right 2010   • COLONOSCOPY     • JOINT REPLACEMENT Bilateral    • KNEE ARTHROSCOPY Bilateral    • MASTECTOMY Right     2003   • ORTHOPEDIC SURGERY Bilateral     knee arthoscopy   • [place] LAPAROSCOPY SURG CHOLECYSTECTOMY N/A 2/15/2019    Procedure: CHOLECYSTECTOMY LAPAROSCOPIC;  Surgeon: Ayo Hernandez MD;  Location: [facility];  Service: General   • RADICAL ABDOMINAL HYSTERECTOMY     • REDUCTION MAMMAPLASTY Left      Family History   Problem Relation Age of Onset   • Stroke Mother    • Hypertension Mother    • Deep vein thrombosis Mother    • Varicose Veins Mother    • Cancer Father      Social History     Tobacco Use   • Smoking status: Never     Passive exposure: Never   • Smokeless tobacco: Never   Vaping Use   • Vaping status: Never Used   Substance and Sexual Activity   • Alcohol use: No   • Drug use: No   • Sexual activity: Never     Current Outpatient Medications on File Prior to Visit   Medication Sig   • atorvastatin (LIPITOR) 10 mg  "tablet Take 1 tablet (10 mg total) by mouth daily   • Calcium Carbonate-Vitamin D 600-400 MG-UNIT per tablet Take 1 tablet by mouth 2 (two) times a day    • Diclofenac Sodium 3 % GEL Apply 2 g topically 2 (two) times a day as needed (pain) GI intolerance with ASA Unable to take nsaids due to age   • polymyxin b-trimethoprim (POLYTRIM) ophthalmic solution instill 1 drop INTO AFFECTED EYE(S) four times a day as directed ...  (REFER TO PRESCRIPTION NOTES).   • prednisoLONE acetate (PRED FORTE) 1 % ophthalmic suspension instill 1 drop INTO AFFECTED EYE(S) four times a day as directed ...  (REFER TO PRESCRIPTION NOTES).   • ALPRAZolam (XANAX) 0.25 mg tablet Take 1 tablet (0.25 mg total) by mouth daily as needed for anxiety (Patient not taking: Reported on 4/24/2025)   • celecoxib (CeleBREX) 50 MG capsule Take 1 capsule (50 mg total) by mouth 2 (two) times a day as needed for mild pain (Patient not taking: Reported on 4/24/2025)   • nitroglycerin (RECTIV) 0.4 % Insert into the rectum every 12 (twelve) hours (Patient not taking: Reported on 4/24/2025)     Allergies   Allergen Reactions   • Latex Itching and Rash     If wearing latex bandages   • Penicillin V Rash   • Other Other (See Comments)     rash   • Aspirin GI Intolerance   • Citrus - Food Allergy Itching and Rash     ACIDIC FOODS   • Oxycodone-Acetaminophen GI Intolerance and Headache   • Wound Dressings Itching     Red and blisters     Objective   /70 (BP Location: Left arm, Patient Position: Sitting, Cuff Size: Standard)   Pulse 80   Temp (!) 97 °F (36.1 °C) (Tympanic)   Ht 5' 3.8\" (1.621 m)   Wt 85.5 kg (188 lb 6.4 oz)   SpO2 98%   BMI 32.54 kg/m²     Physical Exam  Constitutional:       General: She is not in acute distress.     Appearance: She is well-developed. She is not ill-appearing, toxic-appearing or diaphoretic.   Eyes:      Conjunctiva/sclera: Conjunctivae normal.   Cardiovascular:      Rate and Rhythm: Normal rate and regular rhythm.      " Heart sounds: Normal heart sounds. No murmur heard.  Pulmonary:      Effort: Pulmonary effort is normal. No respiratory distress.      Breath sounds: Normal breath sounds. No stridor. No wheezing or rales.   Abdominal:      General: There is no distension.      Palpations: Abdomen is soft. There is no mass.      Tenderness: There is no abdominal tenderness. There is no guarding or rebound.   Musculoskeletal:      Cervical back: Neck supple.      Right lower leg: No edema.      Left lower leg: No edema.   Neurological:      Mental Status: She is alert and oriented to person, place, and time.   Psychiatric:         Mood and Affect: Mood normal.         Behavior: Behavior normal.         Thought Content: Thought content normal.         Judgment: Judgment normal.           Lea Reyes, MD

## 2025-04-24 NOTE — LETTER
2025     Efren Lozada MD  [address] [facility] [address]    Patient: Jacob Brady   YOB: 1945   Date of Visit: 2025       Dear Dr. Efren Lozada MD:    Thank you for referring Jacob Brady to me for evaluation. Below are my notes for this consultation.    If you have questions, please do not hesitate to call me. I look forward to following your patient along with you.         Sincerely,        Lea Reyes, MD        CC: No Recipients    Lea Reyes, MD  2025  3:53 PM  Incomplete  Pre-operative Clearance  Name: Jacob Brady      : 1945      MRN: [id number]  Encounter Provider: Lea Reyes, MD  Encounter Date: 2025   [facility]: [facility]    :  Assessment & Plan  Preop exam for internal medicine  Low risk for low risk procedure       Cataract of both eyes, unspecified cataract type             Pre-operative Clearance:     Revised Cardiac Risk Index:  RCI RISK CLASS I (0 risk factors, risk of major cardiac complications approximately 0.5%)    Clearance:  Patient is medically optimized (CLEARED) for proposed surgery without any additional cardiac testing.      Medication Instructions:   - Benzodiazepines (ie, alprazolam, lorazepam, diazepam):  If the medication is needed, continue to take it on your normal schedule.  - Hyperlipidemia meds: Continue to take this medication on your normal schedule.  - Nitroglycerin: Continue to take this medication on your normal schedule.       History of Present Illness    Pre-Op Examination     Surgery: Cataract surgeries   Anticipated Date of Surgery: Me 5 in May 19   Surgeon: Dr Lozada     Previous history of bleeding disorders or clots?: Yes    Previous Anesthesia reaction?: Yes    Prolonged steroid use in the last 6 months?: No      Assessment of Cardiac Risk:   - Unstable or severe angina or MI in the last 6 weeks or history of stent placement in the last year?: No    -  Decompensated heart failure (e.g. New onset heart failure, NYHA  Class IV heart failure, or worsening existing heart failure)?: No    - Significant arrhythmias such as high grade AV block, symptomatic ventricular arrhythmia, newly recognized ventricular tachycardia, supraventricular tachycardia with resting heart rate >100, or symptomatic bradycardia?: No    - Severe heart valve disease including aortic stenosis or symptomatic mitral stenosis?: No      Pre-operative Risk Factors:  - Elevated-risk surgery: No    - History of cerebrovascular disease: No    - History of ischemic heart disease: No    - History of congestive heart failure: No    - Pre-operative treatment with insulin: No    - Pre-operative creatinine >2 mg/dL: No      Medications of Perioperative Concern:    NSAIDs    Review of Systems   Constitutional:  Negative for fatigue and unexpected weight change.   Respiratory:  Negative for shortness of breath.    Cardiovascular:  Negative for chest pain and palpitations.   Gastrointestinal:  Negative for abdominal pain.   Neurological:  Negative for dizziness and headaches.     Past Medical History  Past Medical History:   Diagnosis Date   • Breast cancer (HCC)     B/L   • Cancer (HCC)    • Chronic low back pain    • Elevated blood pressure reading without diagnosis of hypertension 4/21/2022   • Endometrial cancer (HCC)    • PONV (postoperative nausea and vomiting)    • Uterine cancer (HCC) 11/4/2012     Past Surgical History:   Procedure Laterality Date   • BREAST LUMPECTOMY Left    • BREAST RECONSTRUCTION Right 2010   • COLONOSCOPY     • JOINT REPLACEMENT Bilateral    • KNEE ARTHROSCOPY Bilateral    • MASTECTOMY Right     2003   • ORTHOPEDIC SURGERY Bilateral     knee arthoscopy   • [place] LAPAROSCOPY SURG CHOLECYSTECTOMY N/A 2/15/2019    Procedure: CHOLECYSTECTOMY LAPAROSCOPIC;  Surgeon: Ayo Hernandez MD;  [facility];  Service: General   • RADICAL ABDOMINAL HYSTERECTOMY     • REDUCTION MAMMAPLASTY Left       Family History   Problem Relation Age of Onset   • Stroke Mother    • Hypertension Mother    • Deep vein thrombosis Mother    • Varicose Veins Mother    • Cancer Father      Social History     Tobacco Use   • Smoking status: Never     Passive exposure: Never   • Smokeless tobacco: Never   Vaping Use   • Vaping status: Never Used   Substance and Sexual Activity   • Alcohol use: No   • Drug use: No   • Sexual activity: Never     Current Outpatient Medications on File Prior to Visit   Medication Sig   • atorvastatin (LIPITOR) 10 mg tablet Take 1 tablet (10 mg total) by mouth daily   • Calcium Carbonate-Vitamin D 600-400 MG-UNIT per tablet Take 1 tablet by mouth 2 (two) times a day    • Diclofenac Sodium 3 % GEL Apply 2 g topically 2 (two) times a day as needed (pain) GI intolerance with ASA Unable to take nsaids due to age   • polymyxin b-trimethoprim (POLYTRIM) ophthalmic solution instill 1 drop INTO AFFECTED EYE(S) four times a day as directed ...  (REFER TO PRESCRIPTION NOTES).   • prednisoLONE acetate (PRED FORTE) 1 % ophthalmic suspension instill 1 drop INTO AFFECTED EYE(S) four times a day as directed ...  (REFER TO PRESCRIPTION NOTES).   • ALPRAZolam (XANAX) 0.25 mg tablet Take 1 tablet (0.25 mg total) by mouth daily as needed for anxiety (Patient not taking: Reported on 4/24/2025)   • celecoxib (CeleBREX) 50 MG capsule Take 1 capsule (50 mg total) by mouth 2 (two) times a day as needed for mild pain (Patient not taking: Reported on 4/24/2025)   • nitroglycerin (RECTIV) 0.4 % Insert into the rectum every 12 (twelve) hours (Patient not taking: Reported on 4/24/2025)     Allergies   Allergen Reactions   • Latex Itching and Rash     If wearing latex bandages   • Penicillin V Rash   • Other Other (See Comments)     rash   • Aspirin GI Intolerance   • Citrus - Food Allergy Itching and Rash     ACIDIC FOODS   • Oxycodone-Acetaminophen GI Intolerance and Headache   • Wound Dressings Itching     Red and blisters  "    Objective  /70 (BP Location: Left arm, Patient Position: Sitting, Cuff Size: Standard)   Pulse 80   Temp (!) 97 °F (36.1 °C) (Tympanic)   Ht 5' 3.8\" (1.621 m)   Wt 85.5 kg (188 lb 6.4 oz)   SpO2 98%   BMI 32.54 kg/m²     Physical Exam  Constitutional:       General: She is not in acute distress.     Appearance: She is well-developed. She is not ill-appearing, toxic-appearing or diaphoretic.   Eyes:      Conjunctiva/sclera: Conjunctivae normal.   Cardiovascular:      Rate and Rhythm: Normal rate and regular rhythm.      Heart sounds: Normal heart sounds. No murmur heard.  Pulmonary:      Effort: Pulmonary effort is normal. No respiratory distress.      Breath sounds: Normal breath sounds. No stridor. No wheezing or rales.   Abdominal:      General: There is no distension.      Palpations: Abdomen is soft. There is no mass.      Tenderness: There is no abdominal tenderness. There is no guarding or rebound.   Musculoskeletal:      Cervical back: Neck supple.      Right lower leg: No edema.      Left lower leg: No edema.   Neurological:      Mental Status: She is alert and oriented to person, place, and time.   Psychiatric:         Mood and Affect: Mood normal.         Behavior: Behavior normal.         Thought Content: Thought content normal.         Judgment: Judgment normal.           Lea Reyes, MD Lea Reyes, MD  2025  2:37 PM  Sign when Signing Visit  Pre-operative Clearance  Name: Jacob Brady      : 1945      MRN: [id number]  Encounter Provider: Lea Reyes, MD  Encounter Date: 2025   [facility]: [facility]    :  Assessment & Plan        Pre-operative Clearance:     Medication Instructions:   - Benzodiazepines (ie, alprazolam, lorazepam, diazepam):  If the medication is needed, continue to take it on your normal schedule.  - Hyperlipidemia meds: Continue to take this medication on your normal schedule.  - Nitroglycerin: Continue to take this " medication on your normal schedule.       History of Present Illness    Pre-Op Examination       Pre-operative Risk Factors:    - History of cerebrovascular disease: No    - History of ischemic heart disease: No    - History of congestive heart failure: No    - Pre-operative treatment with insulin: No    - Pre-operative creatinine >2 mg/dL: No      Review of Systems   Constitutional:  Negative for fatigue and unexpected weight change.   Respiratory:  Negative for shortness of breath.    Cardiovascular:  Negative for chest pain and palpitations.   Gastrointestinal:  Negative for abdominal pain.   Neurological:  Negative for dizziness and headaches.     Past Medical History  Past Medical History:   Diagnosis Date   • Breast cancer (HCC)     B/L   • Cancer (HCC)    • Chronic low back pain    • Elevated blood pressure reading without diagnosis of hypertension 4/21/2022   • Endometrial cancer (HCC)    • PONV (postoperative nausea and vomiting)    • Uterine cancer (HCC) 11/4/2012     Past Surgical History:   Procedure Laterality Date   • BREAST LUMPECTOMY Left    • BREAST RECONSTRUCTION Right 2010   • COLONOSCOPY     • JOINT REPLACEMENT Bilateral    • KNEE ARTHROSCOPY Bilateral    • MASTECTOMY Right     2003   • ORTHOPEDIC SURGERY Bilateral     knee arthoscopy   • [place] LAPAROSCOPY SURG CHOLECYSTECTOMY N/A 2/15/2019    Procedure: CHOLECYSTECTOMY LAPAROSCOPIC;  Surgeon: Ayo Hernandez MD;  [facility];  Service: General   • RADICAL ABDOMINAL HYSTERECTOMY     • REDUCTION MAMMAPLASTY Left      Family History   Problem Relation Age of Onset   • Stroke Mother    • Hypertension Mother    • Deep vein thrombosis Mother    • Varicose Veins Mother    • Cancer Father      Social History     Tobacco Use   • Smoking status: Never     Passive exposure: Never   • Smokeless tobacco: Never   Vaping Use   • Vaping status: Never Used   Substance and Sexual Activity   • Alcohol use: No   • Drug use: No   • Sexual activity: Never  "    Current Outpatient Medications on File Prior to Visit   Medication Sig   • atorvastatin (LIPITOR) 10 mg tablet Take 1 tablet (10 mg total) by mouth daily   • Calcium Carbonate-Vitamin D 600-400 MG-UNIT per tablet Take 1 tablet by mouth 2 (two) times a day    • Diclofenac Sodium 3 % GEL Apply 2 g topically 2 (two) times a day as needed (pain) GI intolerance with ASA Unable to take nsaids due to age   • polymyxin b-trimethoprim (POLYTRIM) ophthalmic solution instill 1 drop INTO AFFECTED EYE(S) four times a day as directed ...  (REFER TO PRESCRIPTION NOTES).   • prednisoLONE acetate (PRED FORTE) 1 % ophthalmic suspension instill 1 drop INTO AFFECTED EYE(S) four times a day as directed ...  (REFER TO PRESCRIPTION NOTES).   • ALPRAZolam (XANAX) 0.25 mg tablet Take 1 tablet (0.25 mg total) by mouth daily as needed for anxiety (Patient not taking: Reported on 4/24/2025)   • celecoxib (CeleBREX) 50 MG capsule Take 1 capsule (50 mg total) by mouth 2 (two) times a day as needed for mild pain (Patient not taking: Reported on 4/24/2025)   • nitroglycerin (RECTIV) 0.4 % Insert into the rectum every 12 (twelve) hours (Patient not taking: Reported on 4/24/2025)     Allergies   Allergen Reactions   • Latex Itching and Rash     If wearing latex bandages   • Penicillin V Rash   • Other Other (See Comments)     rash   • Aspirin GI Intolerance   • Citrus - Food Allergy Itching and Rash     ACIDIC FOODS   • Oxycodone-Acetaminophen GI Intolerance and Headache   • Wound Dressings Itching     Red and blisters     Objective  /70 (BP Location: Left arm, Patient Position: Sitting, Cuff Size: Standard)   Pulse 80   Temp (!) 97 °F (36.1 °C) (Tympanic)   Ht 5' 3.8\" (1.621 m)   Wt 85.5 kg (188 lb 6.4 oz)   SpO2 98%   BMI 32.54 kg/m²     Physical Exam  Constitutional:       General: She is not in acute distress.     Appearance: She is well-developed. She is not ill-appearing, toxic-appearing or diaphoretic.   Eyes:      " Conjunctiva/sclera: Conjunctivae normal.   Cardiovascular:      Rate and Rhythm: Normal rate and regular rhythm.      Heart sounds: Normal heart sounds. No murmur heard.  Pulmonary:      Effort: Pulmonary effort is normal. No respiratory distress.      Breath sounds: Normal breath sounds. No stridor. No wheezing or rales.   Abdominal:      General: There is no distension.      Palpations: Abdomen is soft. There is no mass.      Tenderness: There is no abdominal tenderness. There is no guarding or rebound.   Musculoskeletal:      Cervical back: Neck supple.      Right lower leg: No edema.      Left lower leg: No edema.   Neurological:      Mental Status: She is alert and oriented to person, place, and time.   Psychiatric:         Mood and Affect: Mood normal.         Behavior: Behavior normal.         Thought Content: Thought content normal.         Judgment: Judgment normal.           Lea Reyes, MD

## 2025-05-06 ENCOUNTER — TELEPHONE (OUTPATIENT)
Age: 80
End: 2025-05-06

## 2025-05-06 NOTE — TELEPHONE ENCOUNTER
Patient said that during her last visit the doctor said she has stage 3 kidney disease. Patient did not know this and she wants to know if this is correct. Please, advise.

## 2025-05-06 NOTE — TELEPHONE ENCOUNTER
Spoke with the patient that she has stage IIIa chronic kidney disease based on her EGFR.  Explained that there is not anything to do that we are not doing already which is keeping her blood pressure sugar and cholesterol controlled.  Also advised to stay properly hydrated, avoid NSAIDs as much as possible.  We will keep monitoring her kidney function which is a part of her routine blood work

## 2025-08-12 ENCOUNTER — TELEPHONE (OUTPATIENT)
Age: 80
End: 2025-08-12

## (undated) DEVICE — IRRIG ENDO FLO TUBING

## (undated) DEVICE — VIAL DECANTER

## (undated) DEVICE — INTENDED FOR TISSUE SEPARATION, AND OTHER PROCEDURES THAT REQUIRE A SHARP SURGICAL BLADE TO PUNCTURE OR CUT.: Brand: BARD-PARKER SAFETY BLADES SIZE 11, STERILE

## (undated) DEVICE — ALLENTOWN LAP CHOLE APP PACK: Brand: CARDINAL HEALTH

## (undated) DEVICE — TROCAR APPPLE 5MM EXTENDED LENGTH

## (undated) DEVICE — ELECTRODE LAP J HOOK SPLIT STEM E-Z CLEAN 33CM -0021S

## (undated) DEVICE — UNDYED BRAIDED (POLYGLACTIN 910), SYNTHETIC ABSORBABLE SUTURE: Brand: COATED VICRYL

## (undated) DEVICE — GLOVE SRG BIOGEL ECLIPSE 7

## (undated) DEVICE — NEEDLE 22 G X 1 1/2 SAFETY

## (undated) DEVICE — CHLORAPREP HI-LITE 26ML ORANGE

## (undated) DEVICE — STERILE SURGICAL LUBRICANT,  TUBE: Brand: SURGILUBE

## (undated) DEVICE — SUT MONOCRYL 4-0 PS-2 27 IN Y426H

## (undated) DEVICE — LIGAMAX 5 MM ENDOSCOPIC MULTIPLE CLIP APPLIER: Brand: LIGAMAX

## (undated) DEVICE — LIGHT HANDLE COVER SLEEVE DISP BLUE STELLAR

## (undated) DEVICE — TOWEL SURG XR DETECT GREEN STRL RFD

## (undated) DEVICE — ENDOPATH XCEL BLADELESS TROCARS WITH STABILITY SLEEVES: Brand: ENDOPATH XCEL

## (undated) DEVICE — ADHESIVE SKN CLSR HISTOACRYL FLEX 0.5ML LF

## (undated) DEVICE — ENDOPOUCH RETRIEVER SPECIMEN RETRIEVAL BAGS: Brand: ENDOPOUCH RETRIEVER

## (undated) DEVICE — PAD GROUNDING ADULT

## (undated) DEVICE — SCD SEQUENTIAL COMPRESSION COMFORT SLEEVE MEDIUM KNEE LENGTH: Brand: KENDALL SCD

## (undated) DEVICE — DRAPE EQUIPMENT RF WAND